# Patient Record
Sex: FEMALE | Race: WHITE | NOT HISPANIC OR LATINO | Employment: OTHER | URBAN - METROPOLITAN AREA
[De-identification: names, ages, dates, MRNs, and addresses within clinical notes are randomized per-mention and may not be internally consistent; named-entity substitution may affect disease eponyms.]

---

## 2015-11-02 LAB — BMD RECOMMENDATION EXT: NORMAL

## 2015-12-07 LAB — CRC RECOMMENDATION EXT: NORMAL

## 2018-10-17 LAB — BMD RECOMMENDATION EXT: NORMAL

## 2021-08-24 LAB
BMD RECOMMENDATION EXT: NORMAL
BMD RECOMMENDATION EXT: NORMAL

## 2023-02-09 ENCOUNTER — PATIENT MESSAGE (OUTPATIENT)
Dept: ADMINISTRATIVE | Facility: HOSPITAL | Age: 68
End: 2023-02-09
Payer: MEDICARE

## 2023-02-23 ENCOUNTER — PATIENT MESSAGE (OUTPATIENT)
Dept: FAMILY MEDICINE | Facility: CLINIC | Age: 68
End: 2023-02-23
Payer: MEDICARE

## 2023-02-24 ENCOUNTER — OFFICE VISIT (OUTPATIENT)
Dept: FAMILY MEDICINE | Facility: CLINIC | Age: 68
End: 2023-02-24
Payer: MEDICARE

## 2023-02-24 VITALS
BODY MASS INDEX: 27.28 KG/M2 | TEMPERATURE: 98 F | HEIGHT: 69 IN | HEART RATE: 64 BPM | WEIGHT: 184.19 LBS | SYSTOLIC BLOOD PRESSURE: 100 MMHG | DIASTOLIC BLOOD PRESSURE: 66 MMHG | OXYGEN SATURATION: 98 %

## 2023-02-24 DIAGNOSIS — Z87.891 PERSONAL HISTORY OF NICOTINE DEPENDENCE: ICD-10-CM

## 2023-02-24 DIAGNOSIS — Z12.31 ENCOUNTER FOR SCREENING MAMMOGRAM FOR BREAST CANCER: ICD-10-CM

## 2023-02-24 DIAGNOSIS — F17.200 CURRENTLY SMOKES TOBACCO: ICD-10-CM

## 2023-02-24 DIAGNOSIS — R00.1 BRADYCARDIA: ICD-10-CM

## 2023-02-24 DIAGNOSIS — K91.2 POSTSURGICAL MALABSORPTION, NOT ELSEWHERE CLASSIFIED: ICD-10-CM

## 2023-02-24 DIAGNOSIS — M81.0 OSTEOPOROSIS, UNSPECIFIED OSTEOPOROSIS TYPE, UNSPECIFIED PATHOLOGICAL FRACTURE PRESENCE: Primary | ICD-10-CM

## 2023-02-24 DIAGNOSIS — E88.89 OTHER SPECIFIED METABOLIC DISORDERS: ICD-10-CM

## 2023-02-24 PROCEDURE — 99214 OFFICE O/P EST MOD 30 MIN: CPT | Mod: PBBFAC

## 2023-02-24 NOTE — PROGRESS NOTES
Chief Complaint  Establish Care      History of Present Illness  Florence Swann is a 67 y.o. year old female presents to the clinic to establish care. 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   Pt reports no prior medical issues and is not currently taking any medications other than occasional Tylenol.     Her acute complaints include restless leg and bilateral lower back pain radiating to sacral area and down to bilateral legs. Both complaints have been ongoing for years, however low back pain has worsened. No bladder or bowel incontinence, no falls or trauma, no loss of motor function or sensation of lower extremities.     SH: gastric bypass in 2004, cholecystectomy, total hysterectomy, appendectomy  FH: Mother :goiter, CHF, DM. Father : DM, leukemia  Social hx: daily tobacco use;  3/4 to 1 full pack per day, no EtOH, no recreational drugs, not currently sexually active    Screening history:   Mammogram several years ago  DEXA : 2021; evidence of osteoporosis in femoral neck and lumbar spine (not on treatment or supplements)  Colonoscopy in 2015 with polyp, no repeat since  Lung CA screening-cannot recall        Review of Systems   HENT:  Negative for hearing loss.    Eyes:  Negative for discharge.   Respiratory:  Negative for wheezing.    Cardiovascular:  Negative for chest pain and palpitations.   Gastrointestinal:  Negative for blood in stool, constipation, diarrhea and vomiting.   Genitourinary:  Negative for dysuria and hematuria.   Musculoskeletal:  Positive for back pain. Negative for falls, joint pain and neck pain.   Neurological:  Negative for tingling, tremors, sensory change, weakness and headaches.   Endo/Heme/Allergies:  Negative for polydipsia.   Psychiatric/Behavioral:  The patient has insomnia.        Physical Exam  Vitals and nursing note reviewed.   Constitutional:       General: She is not in acute  distress.  Eyes:      Conjunctiva/sclera: Conjunctivae normal.   Cardiovascular:      Rate and Rhythm: Regular rhythm. Bradycardia present.      Heart sounds: Normal heart sounds.   Pulmonary:      Effort: Pulmonary effort is normal.      Breath sounds: Normal breath sounds.   Abdominal:      General: There is no distension.      Tenderness: There is no abdominal tenderness.   Musculoskeletal:      Right lower leg: No edema.      Left lower leg: No edema.   Neurological:      General: No focal deficit present.   Psychiatric:         Mood and Affect: Mood normal.         Behavior: Behavior normal.       Vitals:    02/24/23 1323   BP: 100/66   Pulse: 64   Temp: 98.1 °F (36.7 °C)     Wt Readings from Last 2 Encounters:   02/24/23 83.6 kg (184 lb 3.2 oz)         Current Outpatient Medications  Current Outpatient Medications   Medication Instructions    acetaminophen (TYLENOL ARTHRITIS PAIN ORAL) No dose, route, or frequency recorded.             Assessment / Plan:    1. Osteoporosis, unspecified osteoporosis type, unspecified pathological fracture presence  -recommended pt to be initiated on bisphosphonate therapy  -handout given on information regarding Alendronate  - checking Vitamin D and Ca levels  -will discuss with pt the possibility of Alendronate therapy as well as Vit D and Ca supplementation at next visit    2. Encounter for screening mammogram for breast cancer  -pt will need screening mammogram  -order needs to be placed at next visit, pt prefers to get things done one at a time    3. Currently smokes tobacco  4. Personal history of nicotine dependence  - CT Chest Lung Screening Low Dose ordered today  -pt is current every day smoker with 1 pack per day smoking history  - CBC, CMP ordered    5. Bradycardia  -asymptomatic  - TSH; Future  - T4, Free; Future    6. Postsurgical malabsorption, not elsewhere classified  -pt states that since her gastric bypass procedure she has not been consuming meat and has not  been taking any supplements  -checking vit B12 levels      Other orders at this visit:   - Hemoglobin A1C; Future  - Lipid Panel; Future  - Hepatitis C Antibody; Future  - HIV 1/2 Ag/Ab (4th Gen); Future        Follow up:    In 1 month, or earlier if needed. Will need kenneth and colonoscopy referral.     Christel Chao M.D.  [facility]  PGY-2      Answers submitted by the patient for this visit:  Review of Systems Questionnaire (Submitted on 2/21/2023)  activity change: Yes  unexpected weight change: No  rhinorrhea: No  trouble swallowing: No  visual disturbance: No  chest tightness: No  polyuria: No  difficulty urinating: No  menstrual problem: No  joint swelling: No  arthralgias: No  confusion: No  dysphoric mood: No

## 2023-02-26 NOTE — PROGRESS NOTES
I reviewed History, PE, A/P and chart was reviewed.  Services provided in the outpatient department of  a teaching facility, I was immediately available.  I agree with resident, care reasonable and necessary.   Management discussed with resident at time of visit.    Codi Harris MD  [facility] - [facility] Desir  [facility]

## 2023-03-03 ENCOUNTER — LAB VISIT (OUTPATIENT)
Dept: LAB | Facility: HOSPITAL | Age: 68
End: 2023-03-03
Attending: STUDENT IN AN ORGANIZED HEALTH CARE EDUCATION/TRAINING PROGRAM
Payer: MEDICARE

## 2023-03-03 DIAGNOSIS — R00.1 BRADYCARDIA: ICD-10-CM

## 2023-03-03 DIAGNOSIS — F17.200 CURRENTLY SMOKES TOBACCO: ICD-10-CM

## 2023-03-03 DIAGNOSIS — K91.2 POSTSURGICAL MALABSORPTION, NOT ELSEWHERE CLASSIFIED: ICD-10-CM

## 2023-03-03 DIAGNOSIS — Z87.891 PERSONAL HISTORY OF NICOTINE DEPENDENCE: ICD-10-CM

## 2023-03-03 DIAGNOSIS — E88.89 OTHER SPECIFIED METABOLIC DISORDERS: ICD-10-CM

## 2023-03-03 DIAGNOSIS — M81.0 OSTEOPOROSIS, UNSPECIFIED OSTEOPOROSIS TYPE, UNSPECIFIED PATHOLOGICAL FRACTURE PRESENCE: ICD-10-CM

## 2023-03-03 LAB
ALBUMIN SERPL-MCNC: 3.7 G/DL (ref 3.4–4.8)
ALBUMIN/GLOB SERPL: 1 RATIO (ref 1.1–2)
ALP SERPL-CCNC: 84 UNIT/L (ref 40–150)
ALT SERPL-CCNC: 9 UNIT/L (ref 0–55)
AST SERPL-CCNC: 15 UNIT/L (ref 5–34)
BASOPHILS # BLD AUTO: 0.1 X10(3)/MCL (ref 0–0.2)
BASOPHILS NFR BLD AUTO: 1.8 %
BILIRUBIN DIRECT+TOT PNL SERPL-MCNC: 0.8 MG/DL
BUN SERPL-MCNC: 10.2 MG/DL (ref 9.8–20.1)
CALCIUM SERPL-MCNC: 9.8 MG/DL (ref 8.4–10.2)
CHLORIDE SERPL-SCNC: 105 MMOL/L (ref 98–107)
CHOLEST SERPL-MCNC: 253 MG/DL
CHOLEST/HDLC SERPL: 4 {RATIO} (ref 0–5)
CO2 SERPL-SCNC: 24 MMOL/L (ref 23–31)
CREAT SERPL-MCNC: 0.82 MG/DL (ref 0.55–1.02)
DEPRECATED CALCIDIOL+CALCIFEROL SERPL-MC: 36.8 NG/ML (ref 30–80)
EOSINOPHIL # BLD AUTO: 0.15 X10(3)/MCL (ref 0–0.9)
EOSINOPHIL NFR BLD AUTO: 2.7 %
ERYTHROCYTE [DISTWIDTH] IN BLOOD BY AUTOMATED COUNT: 18.7 % (ref 11.5–17)
EST. AVERAGE GLUCOSE BLD GHB EST-MCNC: 111.2 MG/DL
GFR SERPLBLD CREATININE-BSD FMLA CKD-EPI: >60 MLS/MIN/1.73/M2
GLOBULIN SER-MCNC: 3.6 GM/DL (ref 2.4–3.5)
GLUCOSE SERPL-MCNC: 84 MG/DL (ref 82–115)
HBA1C MFR BLD: 5.5 %
HCT VFR BLD AUTO: 34.7 % (ref 37–47)
HCV AB SERPL QL IA: NONREACTIVE
HDLC SERPL-MCNC: 61 MG/DL (ref 35–60)
HGB BLD-MCNC: 10.8 G/DL (ref 12–16)
HIV 1+2 AB+HIV1 P24 AG SERPL QL IA: NONREACTIVE
IMM GRANULOCYTES # BLD AUTO: 0.01 X10(3)/MCL (ref 0–0.04)
IMM GRANULOCYTES NFR BLD AUTO: 0.2 %
LDLC SERPL CALC-MCNC: 175 MG/DL (ref 50–140)
LYMPHOCYTES # BLD AUTO: 1.34 X10(3)/MCL (ref 0.6–4.6)
LYMPHOCYTES NFR BLD AUTO: 24.5 %
MCH RBC QN AUTO: 22.6 PG
MCHC RBC AUTO-ENTMCNC: 31.1 G/DL (ref 33–36)
MCV RBC AUTO: 72.6 FL (ref 80–94)
MONOCYTES # BLD AUTO: 0.63 X10(3)/MCL (ref 0.1–1.3)
MONOCYTES NFR BLD AUTO: 11.5 %
NEUTROPHILS # BLD AUTO: 3.25 X10(3)/MCL (ref 2.1–9.2)
NEUTROPHILS NFR BLD AUTO: 59.3 %
NRBC BLD AUTO-RTO: 0 %
PLATELET # BLD AUTO: 394 X10(3)/MCL (ref 130–400)
PMV BLD AUTO: 9.4 FL (ref 7.4–10.4)
POTASSIUM SERPL-SCNC: 4.4 MMOL/L (ref 3.5–5.1)
PROT SERPL-MCNC: 7.3 GM/DL (ref 5.8–7.6)
RBC # BLD AUTO: 4.78 X10(6)/MCL (ref 4.2–5.4)
SODIUM SERPL-SCNC: 138 MMOL/L (ref 136–145)
T4 FREE SERPL-MCNC: 0.99 NG/DL (ref 0.7–1.48)
TRIGL SERPL-MCNC: 85 MG/DL (ref 37–140)
TSH SERPL-ACNC: 1.35 UIU/ML (ref 0.35–4.94)
VIT B12 SERPL-MCNC: 496 PG/ML (ref 213–816)
VLDLC SERPL CALC-MCNC: 17 MG/DL
WBC # SPEC AUTO: 5.5 X10(3)/MCL (ref 4.5–11.5)

## 2023-03-03 PROCEDURE — 84439 ASSAY OF FREE THYROXINE: CPT

## 2023-03-03 PROCEDURE — 82607 VITAMIN B-12: CPT

## 2023-03-03 PROCEDURE — 84443 ASSAY THYROID STIM HORMONE: CPT

## 2023-03-03 PROCEDURE — 80053 COMPREHEN METABOLIC PANEL: CPT

## 2023-03-03 PROCEDURE — 80061 LIPID PANEL: CPT

## 2023-03-03 PROCEDURE — 86803 HEPATITIS C AB TEST: CPT

## 2023-03-03 PROCEDURE — 85025 COMPLETE CBC W/AUTO DIFF WBC: CPT

## 2023-03-03 PROCEDURE — 36415 COLL VENOUS BLD VENIPUNCTURE: CPT

## 2023-03-03 PROCEDURE — 83036 HEMOGLOBIN GLYCOSYLATED A1C: CPT

## 2023-03-03 PROCEDURE — 87389 HIV-1 AG W/HIV-1&-2 AB AG IA: CPT

## 2023-03-03 PROCEDURE — 82306 VITAMIN D 25 HYDROXY: CPT

## 2023-03-07 DIAGNOSIS — Z12.31 SCREENING MAMMOGRAM FOR BREAST CANCER: Primary | ICD-10-CM

## 2023-03-07 DIAGNOSIS — Z12.11 COLON CANCER SCREENING: ICD-10-CM

## 2023-03-10 ENCOUNTER — HOSPITAL ENCOUNTER (OUTPATIENT)
Dept: RADIOLOGY | Facility: HOSPITAL | Age: 68
Discharge: HOME | End: 2023-03-10
Attending: STUDENT IN AN ORGANIZED HEALTH CARE EDUCATION/TRAINING PROGRAM
Payer: MEDICARE

## 2023-03-10 DIAGNOSIS — Z87.891 PERSONAL HISTORY OF NICOTINE DEPENDENCE: ICD-10-CM

## 2023-03-10 DIAGNOSIS — F17.200 CURRENTLY SMOKES TOBACCO: ICD-10-CM

## 2023-03-10 PROCEDURE — 71271 CT THORAX LUNG CANCER SCR C-: CPT | Mod: TC

## 2023-03-16 ENCOUNTER — HOSPITAL ENCOUNTER (OUTPATIENT)
Dept: RADIOLOGY | Facility: HOSPITAL | Age: 68
Discharge: HOME | End: 2023-03-16
Attending: STUDENT IN AN ORGANIZED HEALTH CARE EDUCATION/TRAINING PROGRAM
Payer: MEDICARE

## 2023-03-16 DIAGNOSIS — Z12.31 SCREENING MAMMOGRAM FOR BREAST CANCER: ICD-10-CM

## 2023-03-16 PROCEDURE — 77067 SCR MAMMO BI INCL CAD: CPT | Mod: 26,,, | Performed by: RADIOLOGY

## 2023-03-16 PROCEDURE — 77067 MAMMO DIGITAL SCREENING BILAT WITH TOMO: ICD-10-PCS | Mod: 26,,, | Performed by: RADIOLOGY

## 2023-03-16 PROCEDURE — 77067 SCR MAMMO BI INCL CAD: CPT | Mod: TC

## 2023-03-16 PROCEDURE — 77063 BREAST TOMOSYNTHESIS BI: CPT | Mod: 26,,, | Performed by: RADIOLOGY

## 2023-03-16 PROCEDURE — 77063 MAMMO DIGITAL SCREENING BILAT WITH TOMO: ICD-10-PCS | Mod: 26,,, | Performed by: RADIOLOGY

## 2023-03-21 ENCOUNTER — OFFICE VISIT (OUTPATIENT)
Dept: FAMILY MEDICINE | Facility: CLINIC | Age: 68
End: 2023-03-21
Payer: MEDICARE

## 2023-03-21 VITALS
DIASTOLIC BLOOD PRESSURE: 76 MMHG | WEIGHT: 184.38 LBS | RESPIRATION RATE: 18 BRPM | BODY MASS INDEX: 27.31 KG/M2 | HEIGHT: 69 IN | TEMPERATURE: 98 F | SYSTOLIC BLOOD PRESSURE: 111 MMHG | OXYGEN SATURATION: 98 % | HEART RATE: 59 BPM

## 2023-03-21 DIAGNOSIS — E78.2 MIXED HYPERLIPIDEMIA: ICD-10-CM

## 2023-03-21 DIAGNOSIS — M81.0 OSTEOPOROSIS OF LUMBAR SPINE: ICD-10-CM

## 2023-03-21 DIAGNOSIS — K95.89 COMPLICATION OF BARIATRIC PROCEDURE: Primary | ICD-10-CM

## 2023-03-21 PROCEDURE — 90750 HZV VACC RECOMBINANT IM: CPT | Mod: PBBFAC

## 2023-03-21 PROCEDURE — 99215 OFFICE O/P EST HI 40 MIN: CPT | Mod: PBBFAC,25

## 2023-03-21 PROCEDURE — 90471 IMMUNIZATION ADMIN: CPT | Mod: PBBFAC

## 2023-03-21 RX ORDER — ALENDRONATE SODIUM 70 MG/1
70 TABLET ORAL
Qty: 4 TABLET | Refills: 6 | Status: SHIPPED | OUTPATIENT
Start: 2023-03-21 | End: 2023-06-21

## 2023-03-21 RX ADMIN — Medication 0.5 ML: at 03:03

## 2023-03-21 NOTE — PROGRESS NOTES
Chief Complaint  Follow-up (1 Month f/u)      History of Present Illness  Florence Swann is a 67 y.o. year old female presents to the clinic for follow up on chronic conditions.   Pt last seen by me on 2/24/23 to establish care at which time routine lab work was done and had several health maintenance/preventative imaging ordered.   Pt has no acute issues, reviewed all findings. Pt does express to me that she has had difficulty with several different food types since her bariatric surgery several years ago and that she can only eat mashed potatoes and wheat cereal. She has not been able to eat any meat since the procedure. She is interested in having a consultation with a bariatric surgeon, who can discuss options with her in regards to possible revision of her previous surgery.         Chronic issues  Osteoporosis of femoral neck and lumbar spine-not currently on any treatment or supplements  Current tobacco smoker-1 ppd smoking h/o  H/o colon polyps in 2015  H/o gastric bypass surgery       Health Maintenance  Lung CA screen: 2023 Bi-Rads 2, repeat annual screening  DEXA recent in 2021, osteoporosis  Mammogram: 2023, official read pending  Declines PCV 20, received shingles (dose #1) in 03/2023  S/p hysterectomy, no PAP indicated        Review of Systems   HENT:  Negative for hearing loss.    Eyes:  Negative for discharge.   Respiratory:  Negative for wheezing.    Cardiovascular:  Negative for chest pain and palpitations.   Gastrointestinal:  Negative for blood in stool, constipation, diarrhea and vomiting.   Genitourinary:  Negative for dysuria and hematuria.   Musculoskeletal:  Negative for neck pain.   Neurological:  Negative for weakness and headaches.   Endo/Heme/Allergies:  Negative for polydipsia.       Physical Exam  Vitals and nursing note reviewed.   Constitutional:       General: She is not in acute distress.  HENT:      Mouth/Throat:      Comments: Pt has full set of dentures  Eyes:       Conjunctiva/sclera: Conjunctivae normal.   Cardiovascular:      Rate and Rhythm: Normal rate and regular rhythm.      Heart sounds: Normal heart sounds.   Pulmonary:      Effort: Pulmonary effort is normal.      Breath sounds: Normal breath sounds.   Abdominal:      General: There is no distension.      Tenderness: There is no abdominal tenderness.   Musculoskeletal:      Right lower leg: No edema.      Left lower leg: No edema.   Neurological:      General: No focal deficit present.   Psychiatric:         Mood and Affect: Mood normal.         Behavior: Behavior normal.       Vitals:    03/21/23 1402   BP: 111/76   Pulse: (!) 59   Resp: 18   Temp: 97.7 °F (36.5 °C)     Wt Readings from Last 2 Encounters:   03/21/23 83.6 kg (184 lb 6.4 oz)   02/24/23 83.6 kg (184 lb 3.2 oz)         Current Outpatient Medications  Current Outpatient Medications   Medication Instructions    acetaminophen (TYLENOL ARTHRITIS PAIN ORAL) No dose, route, or frequency recorded.             Assessment / Plan:    1. Complication of bariatric procedure  - Ambulatory referral/consult to General Surgery (bariatric surgeon Dr. Nomi Vale)  -vit B12 and Vit D wnl    2. Osteoporosis of lumbar spine  -pt brought old records, most recent DEXA in May of 2022 with evidence of osteoporosis  -records are still in process of being scanned  -after discussion regarding risks vs benefits and side effect profile, pt is amenable to be initiated on Alendronate  -she has set of full dentures  -Rx for Alendronate sent (started 03/2023)  -pt also recommended to supplement with Vit D (800 IU daily ) and Ca (1200 mg daily), Rx sent for combination tablet.   -repeat DEXA in 2 years to evaluate response    3. Mixed hyperlipidemia  -cholesterol 253,   -printed handout given regarding low cholesterol diet  -pt would like to wait on starting statin therapy at this time. Discussed risks vs benefits given her ASCVD risk is 8/4%    -The 10-year ASCVD risk score  (Kavita LOUIE, et al., 2019) is: 8.4%    Values used to calculate the score:      Age: 67 years      Sex: Female      Is Non- : No      Diabetic: No      Tobacco smoker: Yes      Systolic Blood Pressure: 111 mmHg      Is BP treated: No      HDL Cholesterol: 61 mg/dL      Total Cholesterol: 253 mg/dL            Follow up:    In 3 mo, or earlier if needed.     Christel Chao M.D.  [facility] PGY-2    Answers submitted by the patient for this visit:  Review of Systems Questionnaire (Submitted on 3/20/2023)  activity change: No  unexpected weight change: No  rhinorrhea: No  trouble swallowing: No  visual disturbance: No  chest tightness: No  polyuria: No  difficulty urinating: No  menstrual problem: No  joint swelling: No  arthralgias: No  confusion: No  dysphoric mood: No

## 2023-03-21 NOTE — PATIENT INSTRUCTIONS
Bariatric surgeon:   Dr. Nomi Vale  [address], [place] [address]  [phone]    -we will start you on Fosamax (Alendronate) for osteoporosis  -colonoscopy referral might take 9 months

## 2023-03-22 RX ORDER — MULTIVITAMIN
2 TABLET ORAL DAILY
Qty: 60 TABLET | Refills: 0 | Status: SHIPPED | OUTPATIENT
Start: 2023-03-22 | End: 2023-06-21

## 2023-03-23 ENCOUNTER — TELEPHONE (OUTPATIENT)
Dept: FAMILY MEDICINE | Facility: CLINIC | Age: 68
End: 2023-03-23
Payer: MEDICARE

## 2023-03-24 RX ORDER — HYDROCORTISONE 25 MG/G
CREAM TOPICAL
COMMUNITY
Start: 2023-03-22

## 2023-03-24 NOTE — PROGRESS NOTES
I reviewed History, PE, A/P and medical record.  Services provided in outpatient department of a teaching hospital/facility, I was immediately available.  I agree with resident, care reasonable and necessary.   I evaluated the patient with resident at time of visit, participated in key parts of H/P and management was discussed.    Full discussion of Fosamax, SE understood    DEXA is not in chart for my review    Need to check on Mammo - not read since 3/16/2023 this delay is unusual      Codi Harris MD  [facility] - [facility] Desir

## 2023-03-24 NOTE — TELEPHONE ENCOUNTER
Please call radiology, it shows Mammo completed but no report since 3/16/2023 which is an usual delay, please check on status

## 2023-03-30 DIAGNOSIS — Z86.010 HISTORY OF COLONIC POLYPS: Primary | ICD-10-CM

## 2023-03-30 NOTE — PROGRESS NOTES
Referral sent to [facility] as per pt's request.     Christel Chao M.D.  [facility] PGY-2

## 2023-04-05 ENCOUNTER — PATIENT MESSAGE (OUTPATIENT)
Dept: FAMILY MEDICINE | Facility: CLINIC | Age: 68
End: 2023-04-05
Payer: MEDICARE

## 2023-06-21 ENCOUNTER — OFFICE VISIT (OUTPATIENT)
Dept: FAMILY MEDICINE | Facility: CLINIC | Age: 68
End: 2023-06-21
Payer: MEDICARE

## 2023-06-21 ENCOUNTER — PATIENT MESSAGE (OUTPATIENT)
Dept: ADMINISTRATIVE | Facility: HOSPITAL | Age: 68
End: 2023-06-21
Payer: MEDICARE

## 2023-06-21 VITALS
WEIGHT: 184.19 LBS | TEMPERATURE: 98 F | SYSTOLIC BLOOD PRESSURE: 128 MMHG | DIASTOLIC BLOOD PRESSURE: 83 MMHG | OXYGEN SATURATION: 100 % | HEART RATE: 56 BPM | BODY MASS INDEX: 27.28 KG/M2 | HEIGHT: 69 IN

## 2023-06-21 DIAGNOSIS — E78.5 HYPERLIPIDEMIA, UNSPECIFIED HYPERLIPIDEMIA TYPE: ICD-10-CM

## 2023-06-21 DIAGNOSIS — Z87.39 HX OF OSTEOPOROSIS: ICD-10-CM

## 2023-06-21 DIAGNOSIS — K95.89 COMPLICATION OF BARIATRIC PROCEDURE: Primary | ICD-10-CM

## 2023-06-21 PROBLEM — Z86.010 PERSONAL HISTORY OF COLONIC POLYPS: Status: ACTIVE | Noted: 2023-06-21

## 2023-06-21 PROBLEM — K25.9 GASTRIC ULCER, UNSPECIFIED AS ACUTE OR CHRONIC, WITHOUT HEMORRHAGE OR PERFORATION: Status: ACTIVE | Noted: 2023-06-21

## 2023-06-21 PROBLEM — K20.90 ESOPHAGITIS, UNSPECIFIED WITHOUT BLEEDING: Status: ACTIVE | Noted: 2023-06-21

## 2023-06-21 PROBLEM — Z48.815 ENCOUNTER FOR SURGICAL AFTERCARE FOLLOWING SURGERY ON THE DIGESTIVE SYSTEM: Status: ACTIVE | Noted: 2023-05-31

## 2023-06-21 PROBLEM — Z86.0100 PERSONAL HISTORY OF COLONIC POLYPS: Status: ACTIVE | Noted: 2023-06-21

## 2023-06-21 PROCEDURE — 99214 OFFICE O/P EST MOD 30 MIN: CPT | Mod: PBBFAC,25

## 2023-06-21 PROCEDURE — 90471 IMMUNIZATION ADMIN: CPT | Mod: PBBFAC

## 2023-06-21 PROCEDURE — 90750 HZV VACC RECOMBINANT IM: CPT | Mod: PBBFAC

## 2023-06-21 RX ORDER — FAMOTIDINE 40 MG/1
40 TABLET, FILM COATED ORAL EVERY MORNING
COMMUNITY
Start: 2023-05-31 | End: 2023-09-26 | Stop reason: SDUPTHER

## 2023-06-21 RX ORDER — MISOPROSTOL 100 UG/1
100 TABLET ORAL 4 TIMES DAILY
COMMUNITY
Start: 2023-06-15 | End: 2023-09-26 | Stop reason: SDUPTHER

## 2023-06-21 RX ADMIN — ZOSTER VACCINE RECOMBINANT, ADJUVANTED 0.5 ML: KIT at 10:06

## 2023-06-21 NOTE — PROGRESS NOTES
[facility]     67 y.o female here for routine f/u.     Acute issues/CC:  Ms. Swann has been seeing the GI specialist since May secondary to bariatric surgery complications. She had the procedure in 2004. She has not been able to tolerate meat since 2004. She has been following with Dr. Maza and Dr. Boswell in GI clinic.   They did an EGD and noted esophageal stenosis and recommend esophageal dilation with balloon x2. She underwent the first dilation procedure and the second is scheduled for 7/18/23. They started her on Famotidine 40 mg and Misoprostol 100 mg. She denies fever, chills, chest pain, SOB, abdominal pain, constipation, and hematochezia.      Chronic issues:  Osteoporosis   -Was started on Alendronate in March 2023, but the doctor stopped the medication as it was upsetting her ulcers Dr. Maza    ROS  See above     PE  Vitals: /83; HR 56; BMI 27.20  General: pleasant and cooperative female in no acute distress  Lungs: clear to auscultation bilaterally   Heart: RRR  Abdomen: Soft, non tender with positive bowel sounds       A/P  Hx of gastric bypasses surgery in 2004  -Has not been able to tolerate meat since 2004  -Continue to follow with GI clinic for 2nd balloon procedure on 7/18/23 to help with esophageal stenosis    Hx of Osteoporosis   -Noted back in 2021. We do not have the records from DEXA in 2021. Patient is agreeable to bring in records at her next visit to be scanned with Ms. Brandt  -Alendronate was discontinued secondary to worsening ulcers  -Will wait to start medical treatment until verifying the DEXA results     HLD   -10 yr ASCVD risk score of 8.4%  -Will recheck lipid panel at her follow up. Patient understands she is to fast prior to lab draw      RTC in 1 month

## 2023-06-23 NOTE — PROGRESS NOTES
I reviewed History, PE, A/P and medical record.  Services provided in outpatient department of a teaching hospital/facility, I was immediately available.  I agree with resident. Care provided was reasonable and necessary.   I discussed the patient with resident at time of visit.   DEXA not scanned in chart but reports history of osteoporosis.   Likely needs Prolia instead of bisphosphonate due to GI concerns. Patient to bring in DEXA report.

## 2023-06-30 ENCOUNTER — TELEPHONE (OUTPATIENT)
Dept: FAMILY MEDICINE | Facility: CLINIC | Age: 68
End: 2023-06-30
Payer: MEDICARE

## 2023-07-01 NOTE — TELEPHONE ENCOUNTER
Spoke with patient regarding her endoscopy findings with Dr. Maza. Given findings of severe GJ ulcers, it is recommended patient discontinues bisphosphonate therapy for her osteoporosis. Patient understands and stated that she already stopped taking it as per Dr. Maza. Will consider Prolia instead\at her next clinic visit on 8/8/23.   All questions concerns addressed.     Christel Chao M.D.  [employer]  PGY-2

## 2023-07-24 DIAGNOSIS — K21.9 ESOPHAGEAL REFLUX: Primary | ICD-10-CM

## 2023-08-08 ENCOUNTER — OFFICE VISIT (OUTPATIENT)
Dept: FAMILY MEDICINE | Facility: CLINIC | Age: 68
End: 2023-08-08
Payer: MEDICARE

## 2023-08-08 VITALS
TEMPERATURE: 98 F | HEART RATE: 57 BPM | SYSTOLIC BLOOD PRESSURE: 108 MMHG | HEIGHT: 69 IN | BODY MASS INDEX: 26.86 KG/M2 | OXYGEN SATURATION: 98 % | WEIGHT: 181.38 LBS | DIASTOLIC BLOOD PRESSURE: 76 MMHG

## 2023-08-08 DIAGNOSIS — D50.9 MICROCYTIC ANEMIA: ICD-10-CM

## 2023-08-08 DIAGNOSIS — R53.83 FATIGUE, UNSPECIFIED TYPE: Primary | ICD-10-CM

## 2023-08-08 DIAGNOSIS — R00.1 BRADYCARDIA: ICD-10-CM

## 2023-08-08 PROBLEM — K91.89 ANASTOMOTIC STENOSIS OF GASTROJEJUNOSTOMY: Status: ACTIVE | Noted: 2023-08-08

## 2023-08-08 PROBLEM — K63.89 OTHER SPECIFIED DISEASES OF INTESTINE: Status: ACTIVE | Noted: 2023-05-31

## 2023-08-08 PROBLEM — K56.699 ANASTOMOTIC STENOSIS OF GASTROJEJUNOSTOMY: Status: ACTIVE | Noted: 2023-08-08

## 2023-08-08 PROCEDURE — 99214 OFFICE O/P EST MOD 30 MIN: CPT | Mod: PBBFAC | Performed by: STUDENT IN AN ORGANIZED HEALTH CARE EDUCATION/TRAINING PROGRAM

## 2023-08-08 PROCEDURE — 93005 ELECTROCARDIOGRAM TRACING: CPT

## 2023-08-08 RX ORDER — SERTRALINE HYDROCHLORIDE 25 MG/1
25 TABLET, FILM COATED ORAL DAILY
Qty: 40 TABLET | Refills: 0 | Status: SHIPPED | OUTPATIENT
Start: 2023-08-08 | End: 2023-09-26 | Stop reason: SDUPTHER

## 2023-08-08 NOTE — PROGRESS NOTES
Chief Complaint  C/O FATIGUE      History of Present Illness  Florence Swann is a 67 y.o. year old female presents to the clinic for generalized fatigue. Pt states that for the last couple of months she has been feeling very tired and has no energy to do anything. Pt has also been more tearful recently and feels overwhelmed with both her family life and personal health. Pt has also been experiencing a tightness/pressure sensation in her upper epigastric area since the most recent balloon dilatation on 7/18 and states at times she needs to take deep breaths to alleviate the sensation. Denies any chest pain or shortness of breath.         PMH/Chronic issues (problems addressed at this visit are noted under A&P)  Osteoporosis of femoral neck and lumbar spine-no DEXA in chart, however reviewed paper records that was asked to be scanned into chart. Started on alendronate, however discontinued due to esophageal strictures. Would likely need Prolia injections instead  H/o colon polyps in 2015, will need repeat colonoscopy  H/o gastric bypass surgery with post-operative complications. Currently seeing Dr. Maza GI specialist, s/p 2 balloon dilatation of esophageal strictures.   Microcytic anemia -on multivitamins    Health Maintenance  Lung CA screen: 2023 Bi-Rads 2, repeat annual screening  DEXA recent in 2021, osteoporosis  Mammogram: 2023, Bi-RADS 1 bilat  Declines PCV 20, received shingles (dose #1) in 03/2023, dose #2 on 06/2023  S/p hysterectomy, no PAP indicated    Review of Systems   Constitutional:  Positive for malaise/fatigue.   HENT:  Negative for hearing loss.    Eyes:  Negative for discharge.   Respiratory:  Negative for wheezing.    Cardiovascular:  Negative for chest pain and palpitations.   Gastrointestinal:  Negative for blood in stool, constipation, diarrhea and vomiting.   Genitourinary:  Negative for dysuria and hematuria.   Musculoskeletal:  Negative for neck pain.   Neurological:  Negative for  weakness and headaches.   Endo/Heme/Allergies:  Negative for polydipsia.         Physical Exam  Vitals and nursing note reviewed.   Constitutional:       General: She is not in acute distress.     Comments: Tearful during exam   Eyes:      Conjunctiva/sclera: Conjunctivae normal.   Cardiovascular:      Rate and Rhythm: Regular rhythm. Bradycardia present.      Heart sounds: Murmur heard.      Diastolic murmur is present with a grade of 1/4.   Pulmonary:      Effort: Pulmonary effort is normal.      Breath sounds: Normal breath sounds.   Musculoskeletal:      Right lower leg: No edema.      Left lower leg: No edema.   Neurological:      General: No focal deficit present.   Psychiatric:         Mood and Affect: Mood normal.         Vitals:    08/08/23 0931   BP: 108/76   Pulse: (!) 57   Temp: 97.9 °F (36.6 °C)     Wt Readings from Last 2 Encounters:   08/08/23 82.3 kg (181 lb 6.4 oz)   06/21/23 83.6 kg (184 lb 3.2 oz)         Current Outpatient Medications  Current Outpatient Medications   Medication Instructions    acetaminophen (TYLENOL ARTHRITIS PAIN ORAL) No dose, route, or frequency recorded.    famotidine (PEPCID) 40 mg, Oral, Every morning    ferrous gluconate 225 mg (27 mg iron) Tab 1 tablet, Oral, Every other day    hydrocortisone 2.5 % cream Topical (Top)    miSOPROStoL (CYTOTEC) 100 mcg, Oral, 4 times daily    sertraline (ZOLOFT) 25 mg, Oral, Daily             Assessment / Plan:    1. Fatigue, unspecified type  -likely multifactorial; depression component, anemia, bradycardia  - Echo ordered  -most recent thyroid function test and vit B12 level wnl  -initiating pt on zoloft 25mg daily, has tolerated in the past    2. Bradycardia  - EKG with bradycardia at 50 bpm, slightly increased OH interval at 204 ms, concern for 1st degree AV block  -echo ordered  -will send referral to cardiology, patient will likely need an event monitor due to symptomatic bradycardia  -most recent    3. Microcytic anemia  -will need  iron panel at next visit  -pt unable to tolerate iron supplements in the past  -will try with ferrous gluconate to be taken every other day          Follow up:    In 6 weeks, or earlier if needed. Will need iron and folic acid levels. Repeat CBC to evaluate for anemia, consider repeat thyroid function studies if fatigue persist.     Christel Chao M.D.  [facility] PGY-3      Answers submitted by the patient for this visit:  Review of Systems Questionnaire (Submitted on 8/5/2023)  activity change: No  unexpected weight change: No  rhinorrhea: No  trouble swallowing: No  visual disturbance: No  chest tightness: No  polyuria: No  difficulty urinating: No  menstrual problem: No  joint swelling: No  arthralgias: No  confusion: No  dysphoric mood: No

## 2023-08-14 NOTE — PROGRESS NOTES
Faculty addendum: Patient discussed with resident. Chart was reviewed including vitals, labs, etc. Care provided reasonable and necessary. I participated in the management of the patient and was immediately available throughout the encounter. Services were furnished in a primary care center located in the outpatient department of a [facility] hospital. I agree with the resident's findings and plan as documented in the resident's note.

## 2023-08-24 ENCOUNTER — HOSPITAL ENCOUNTER (OUTPATIENT)
Dept: CARDIOLOGY | Facility: HOSPITAL | Age: 68
Discharge: HOME | End: 2023-08-24
Attending: STUDENT IN AN ORGANIZED HEALTH CARE EDUCATION/TRAINING PROGRAM
Payer: MEDICARE

## 2023-08-24 VITALS
HEIGHT: 69 IN | DIASTOLIC BLOOD PRESSURE: 79 MMHG | BODY MASS INDEX: 26.81 KG/M2 | WEIGHT: 181 LBS | SYSTOLIC BLOOD PRESSURE: 118 MMHG

## 2023-08-24 DIAGNOSIS — R00.1 BRADYCARDIA: ICD-10-CM

## 2023-08-24 DIAGNOSIS — R53.83 FATIGUE, UNSPECIFIED TYPE: ICD-10-CM

## 2023-08-24 LAB
AV INDEX (PROSTH): 0.84
AV MEAN GRADIENT: 5 MMHG
AV PEAK GRADIENT: 12 MMHG
AV VALVE AREA BY VELOCITY RATIO: 2.42 CM²
AV VALVE AREA: 2.52 CM²
AV VELOCITY RATIO: 0.8
BSA FOR ECHO PROCEDURE: 2 M2
CV ECHO LV RWT: 0.39 CM
DOP CALC AO PEAK VEL: 1.73 M/S
DOP CALC AO VTI: 34.4 CM
DOP CALC LVOT AREA: 3 CM2
DOP CALC LVOT DIAMETER: 1.96 CM
DOP CALC LVOT PEAK VEL: 1.39 M/S
DOP CALC LVOT STROKE VOLUME: 86.85 CM3
DOP CALC MV VTI: 34.1 CM
DOP CALCLVOT PEAK VEL VTI: 28.8 CM
E WAVE DECELERATION TIME: 268.36 MSEC
E/A RATIO: 1.21
ECHO LV POSTERIOR WALL: 0.91 CM (ref 0.6–1.1)
FRACTIONAL SHORTENING: 29 % (ref 28–44)
HR MV ECHO: 52 BPM
INTERVENTRICULAR SEPTUM: 0.93 CM (ref 0.6–1.1)
IVC DIAMETER: 1.1 CM
LEFT ATRIUM SIZE: 3.03 CM
LEFT ATRIUM VOLUME INDEX MOD: 11.8 ML/M2
LEFT ATRIUM VOLUME MOD: 23.46 CM3
LEFT INTERNAL DIMENSION IN SYSTOLE: 3.29 CM (ref 2.1–4)
LEFT VENTRICLE DIASTOLIC VOLUME INDEX: 50.54 ML/M2
LEFT VENTRICLE DIASTOLIC VOLUME: 100.06 ML
LEFT VENTRICLE MASS INDEX: 73 G/M2
LEFT VENTRICLE SYSTOLIC VOLUME INDEX: 22.1 ML/M2
LEFT VENTRICLE SYSTOLIC VOLUME: 43.84 ML
LEFT VENTRICULAR INTERNAL DIMENSION IN DIASTOLE: 4.65 CM (ref 3.5–6)
LEFT VENTRICULAR MASS: 144.38 G
LV LATERAL E/E' RATIO: 5.69 M/S
LVOT MG: 2.87 MMHG
LVOT MV: 0.76 CM/S
MV MEAN GRADIENT: 1 MMHG
MV PEAK A VEL: 0.61 M/S
MV PEAK E VEL: 0.74 M/S
MV PEAK GRADIENT: 3 MMHG
MV VALVE AREA BY CONTINUITY EQUATION: 2.55 CM2
OHS LV EJECTION FRACTION SIMPSONS BIPLANE MOD: 71 %
PISA MRMAX VEL: 1.85 M/S
PISA TR MAX VEL: 2.41 M/S
RA MAJOR: 3.7 CM
RA PRESSURE ESTIMATED: 3 MMHG
RV TB RVSP: 5 MMHG
TDI LATERAL: 0.13 M/S
TR MAX PG: 23 MMHG
TRICUSPID ANNULAR PLANE SYSTOLIC EXCURSION: 3 CM
TV REST PULMONARY ARTERY PRESSURE: 26 MMHG
Z-SCORE OF LEFT VENTRICULAR DIMENSION IN END DIASTOLE: -2.06
Z-SCORE OF LEFT VENTRICULAR DIMENSION IN END SYSTOLE: -0.52

## 2023-08-24 PROCEDURE — 93306 TTE W/DOPPLER COMPLETE: CPT

## 2023-09-26 ENCOUNTER — PATIENT MESSAGE (OUTPATIENT)
Dept: FAMILY MEDICINE | Facility: CLINIC | Age: 68
End: 2023-09-26
Payer: MEDICARE

## 2023-09-26 ENCOUNTER — OFFICE VISIT (OUTPATIENT)
Dept: FAMILY MEDICINE | Facility: CLINIC | Age: 68
End: 2023-09-26
Payer: MEDICARE

## 2023-09-26 VITALS
TEMPERATURE: 98 F | BODY MASS INDEX: 27.15 KG/M2 | HEIGHT: 69 IN | RESPIRATION RATE: 20 BRPM | DIASTOLIC BLOOD PRESSURE: 79 MMHG | OXYGEN SATURATION: 100 % | HEART RATE: 52 BPM | WEIGHT: 183.31 LBS | SYSTOLIC BLOOD PRESSURE: 118 MMHG

## 2023-09-26 DIAGNOSIS — R53.83 FATIGUE, UNSPECIFIED TYPE: ICD-10-CM

## 2023-09-26 DIAGNOSIS — D50.9 MICROCYTIC ANEMIA: Primary | ICD-10-CM

## 2023-09-26 DIAGNOSIS — R00.1 BRADYCARDIA: ICD-10-CM

## 2023-09-26 DIAGNOSIS — M81.0 OSTEOPOROSIS OF LUMBAR SPINE: ICD-10-CM

## 2023-09-26 LAB
BASOPHILS # BLD AUTO: 0.09 X10(3)/MCL
BASOPHILS NFR BLD AUTO: 1.5 %
EOSINOPHIL # BLD AUTO: 0.15 X10(3)/MCL (ref 0–0.9)
EOSINOPHIL NFR BLD AUTO: 2.4 %
ERYTHROCYTE [DISTWIDTH] IN BLOOD BY AUTOMATED COUNT: 23.6 % (ref 11.5–17)
HCT VFR BLD AUTO: 40.7 % (ref 37–47)
HGB BLD-MCNC: 12.3 G/DL (ref 12–16)
IMM GRANULOCYTES # BLD AUTO: 0.01 X10(3)/MCL (ref 0–0.04)
IMM GRANULOCYTES NFR BLD AUTO: 0.2 %
IRON SATN MFR SERPL: 47 % (ref 20–50)
IRON SERPL-MCNC: 191 UG/DL (ref 50–170)
LYMPHOCYTES # BLD AUTO: 1.92 X10(3)/MCL (ref 0.6–4.6)
LYMPHOCYTES NFR BLD AUTO: 31.3 %
MCH RBC QN AUTO: 23.2 PG (ref 27–31)
MCHC RBC AUTO-ENTMCNC: 30.2 G/DL (ref 33–36)
MCV RBC AUTO: 76.6 FL (ref 80–94)
MONOCYTES # BLD AUTO: 0.59 X10(3)/MCL (ref 0.1–1.3)
MONOCYTES NFR BLD AUTO: 9.6 %
NEUTROPHILS # BLD AUTO: 3.37 X10(3)/MCL (ref 2.1–9.2)
NEUTROPHILS NFR BLD AUTO: 55 %
NRBC BLD AUTO-RTO: 0 %
PLATELET # BLD AUTO: 408 X10(3)/MCL (ref 130–400)
PMV BLD AUTO: 10 FL (ref 7.4–10.4)
RBC # BLD AUTO: 5.31 X10(6)/MCL (ref 4.2–5.4)
TIBC SERPL-MCNC: 214 UG/DL (ref 70–310)
TIBC SERPL-MCNC: 405 UG/DL (ref 250–450)
TRANSFERRIN SERPL-MCNC: 337 MG/DL (ref 173–360)
WBC # SPEC AUTO: 6.13 X10(3)/MCL (ref 4.5–11.5)

## 2023-09-26 PROCEDURE — 36415 COLL VENOUS BLD VENIPUNCTURE: CPT | Performed by: STUDENT IN AN ORGANIZED HEALTH CARE EDUCATION/TRAINING PROGRAM

## 2023-09-26 PROCEDURE — 83540 ASSAY OF IRON: CPT | Performed by: STUDENT IN AN ORGANIZED HEALTH CARE EDUCATION/TRAINING PROGRAM

## 2023-09-26 PROCEDURE — 85025 COMPLETE CBC W/AUTO DIFF WBC: CPT | Performed by: STUDENT IN AN ORGANIZED HEALTH CARE EDUCATION/TRAINING PROGRAM

## 2023-09-26 PROCEDURE — 99214 OFFICE O/P EST MOD 30 MIN: CPT | Mod: PBBFAC | Performed by: STUDENT IN AN ORGANIZED HEALTH CARE EDUCATION/TRAINING PROGRAM

## 2023-09-26 RX ORDER — FAMOTIDINE 40 MG/1
40 TABLET, FILM COATED ORAL EVERY MORNING
Qty: 90 TABLET | Refills: 0 | Status: SHIPPED | OUTPATIENT
Start: 2023-09-26 | End: 2023-12-05 | Stop reason: SDUPTHER

## 2023-09-26 RX ORDER — MISOPROSTOL 100 UG/1
100 TABLET ORAL 4 TIMES DAILY
Qty: 120 TABLET | Refills: 1 | Status: SHIPPED | OUTPATIENT
Start: 2023-09-26 | End: 2024-01-05 | Stop reason: SDUPTHER

## 2023-09-26 RX ORDER — SERTRALINE HYDROCHLORIDE 25 MG/1
25 TABLET, FILM COATED ORAL DAILY
Qty: 90 TABLET | Refills: 0 | Status: SHIPPED | OUTPATIENT
Start: 2023-09-26 | End: 2024-01-05 | Stop reason: SDUPTHER

## 2023-09-26 RX ORDER — QUINIDINE GLUCONATE 324 MG
1 TABLET, EXTENDED RELEASE ORAL EVERY OTHER DAY
COMMUNITY
Start: 2023-08-08 | End: 2023-09-26

## 2023-09-26 NOTE — PROGRESS NOTES
Chief Complaint  Follow-up (6 week follow up, medication refills pepcid, iron, misoprostol, etc.)      History of Present Illness  Florence Swann is a 67 y.o. year old female presents to the clinic for follow up on fatigue. Pt reports feeling much better and back to her baseline after initiation of Zoloft. States she was able to finish cleaning her house and perform other duties around the house without issues. She has no complaints today. Pt got declined for revision of her bariatric surgery by Dr. Rahman's office without explanation. Interested in finding another provider.     PMH/Chronic issues (problems addressed at this visit are noted under A&P)  Osteoporosis of femoral neck and lumbar spine-no DEXA in chart, however reviewed paper records that was asked to be scanned into chart. Started on alendronate, however discontinued due to esophageal strictures. Would likely need Prolia injections instead  H/o colon polyps in 2015, will need repeat colonoscopy. Reached out to Dr. Maza office 9/26/23, they will call pt to schedule appt.   H/o gastric bypass surgery with post-operative complications. Currently seeing Dr. Maza GI specialist, s/p 2 balloon dilatation of esophageal strictures.   Microcytic anemia -on multivitamins, last hgb 10.8     Health Maintenance  Lung CA screen: 2023 Bi-Rads 2, repeat annual screening  DEXA recent in 2021, osteoporosis. Will need to discuss prolia injections as alternative treatmen optin.   Mammogram: 2023, Bi-RADS 1 bilat  Declines PCV 20, received shingles (dose #1) in 03/2023, dose #2 on 06/2023  S/p hysterectomy, no PAP indicated      Review of Systems   HENT:  Negative for hearing loss.    Eyes:  Negative for discharge.   Respiratory:  Negative for wheezing.    Cardiovascular:  Negative for chest pain and palpitations.   Gastrointestinal:  Negative for blood in stool, constipation, diarrhea and vomiting.   Genitourinary:  Negative for dysuria and hematuria.    Musculoskeletal:  Negative for neck pain.   Neurological:  Negative for weakness and headaches.   Endo/Heme/Allergies:  Negative for polydipsia.         Physical Exam  Vitals and nursing note reviewed.   Constitutional:       General: She is not in acute distress.  Eyes:      Conjunctiva/sclera: Conjunctivae normal.   Cardiovascular:      Rate and Rhythm: Regular rhythm. Bradycardia present.      Heart sounds: Normal heart sounds.   Pulmonary:      Effort: Pulmonary effort is normal.      Breath sounds: Normal breath sounds.   Musculoskeletal:      Right lower leg: No edema.      Left lower leg: No edema.   Neurological:      General: No focal deficit present.   Psychiatric:         Mood and Affect: Mood normal.         Speech: Speech normal.         Vitals:    09/26/23 0943   BP: 118/79   Pulse: (!) 52   Resp: 20   Temp: 98.3 °F (36.8 °C)     Wt Readings from Last 2 Encounters:   09/26/23 83.1 kg (183 lb 4.8 oz)   08/24/23 82.1 kg (181 lb)         Current Outpatient Medications  Current Outpatient Medications   Medication Instructions    acetaminophen (TYLENOL ARTHRITIS PAIN ORAL) No dose, route, or frequency recorded.    famotidine (PEPCID) 40 mg, Oral, Every morning    ferrous gluconate 225 mg (27 mg iron) Tab 1 tablet, Oral, Every other day    hydrocortisone 2.5 % cream Topical (Top)    miSOPROStoL (CYTOTEC) 100 mcg, Oral, 4 times daily    sertraline (ZOLOFT) 25 mg, Oral, Daily             Assessment / Plan:    1. Microcytic anemia  - Iron and TIBC  - CBC Auto Differential      2. Fatigue, unspecified type  -cbc, iron panel ordered today  -doing better, continue with zoloft 25mg      3. Bradycardia  -seen by [facility], 48 hr Holter done, results pending  -remains asymptomatic    4. Osteoporosis of lumbar spine  -not a candidate for aldendronate therapy due to presence of esophageal strictures,   -will discuss Prolia injections next visit      Follow up:    In 3 mo, or earlier if needed.     Christel Chao M.D.  [facility]  FM PGY-3      Answers submitted by the patient for this visit:  Review of Systems Questionnaire (Submitted on 9/23/2023)  activity change: No  unexpected weight change: No  rhinorrhea: No  trouble swallowing: No  visual disturbance: No  chest tightness: No  polyuria: No  difficulty urinating: No  menstrual problem: No  joint swelling: No  arthralgias: No  confusion: No  dysphoric mood: No

## 2023-10-09 NOTE — PROGRESS NOTES
Faculty addendum: Patient discussed and examined with resident on day of encounter.  Care provided reasonable and necessary. I participated in the management of the patient and was immediately available throughout the encounter. Services were furnished in a primary care center located in the outpatient department of a [facility]. I agree with the resident's findings and plan as documented in the resident's note.     Physical exam: well appearing, pleasant, cheerful, bradycardic with regular rhythm, CTA bilaterally    Deanna Howe, DO

## 2023-10-20 ENCOUNTER — PATIENT MESSAGE (OUTPATIENT)
Dept: FAMILY MEDICINE | Facility: CLINIC | Age: 68
End: 2023-10-20
Payer: MEDICARE

## 2023-10-20 DIAGNOSIS — K95.89 COMPLICATION OF BARIATRIC PROCEDURE: Primary | ICD-10-CM

## 2023-10-30 ENCOUNTER — PATIENT MESSAGE (OUTPATIENT)
Dept: FAMILY MEDICINE | Facility: CLINIC | Age: 68
End: 2023-10-30
Payer: MEDICARE

## 2023-11-28 ENCOUNTER — TELEPHONE (OUTPATIENT)
Dept: FAMILY MEDICINE | Facility: CLINIC | Age: 68
End: 2023-11-28
Payer: MEDICARE

## 2023-11-28 NOTE — TELEPHONE ENCOUNTER
Patient need medical clearance, will be undergoing general anesthesia. If she need appointment send to , and form in your box.

## 2023-12-01 ENCOUNTER — TELEPHONE (OUTPATIENT)
Dept: FAMILY MEDICINE | Facility: CLINIC | Age: 68
End: 2023-12-01
Payer: MEDICARE

## 2023-12-05 ENCOUNTER — PATIENT MESSAGE (OUTPATIENT)
Dept: FAMILY MEDICINE | Facility: CLINIC | Age: 68
End: 2023-12-05
Payer: MEDICARE

## 2023-12-05 RX ORDER — FAMOTIDINE 20 MG/1
20 TABLET, FILM COATED ORAL EVERY MORNING
Qty: 90 TABLET | Refills: 0 | Status: SHIPPED | OUTPATIENT
Start: 2023-12-05 | End: 2024-01-05 | Stop reason: SDUPTHER

## 2023-12-11 ENCOUNTER — OFFICE VISIT (OUTPATIENT)
Dept: FAMILY MEDICINE | Facility: CLINIC | Age: 68
End: 2023-12-11
Payer: MEDICARE

## 2023-12-11 ENCOUNTER — HOSPITAL ENCOUNTER (OUTPATIENT)
Dept: RADIOLOGY | Facility: HOSPITAL | Age: 68
Discharge: HOME | End: 2023-12-11
Attending: STUDENT IN AN ORGANIZED HEALTH CARE EDUCATION/TRAINING PROGRAM
Payer: MEDICARE

## 2023-12-11 VITALS
HEIGHT: 69 IN | BODY MASS INDEX: 28.49 KG/M2 | HEART RATE: 53 BPM | TEMPERATURE: 98 F | OXYGEN SATURATION: 98 % | SYSTOLIC BLOOD PRESSURE: 120 MMHG | DIASTOLIC BLOOD PRESSURE: 72 MMHG | WEIGHT: 192.38 LBS

## 2023-12-11 DIAGNOSIS — Z01.818 PREOPERATIVE CLEARANCE: Primary | ICD-10-CM

## 2023-12-11 DIAGNOSIS — Z01.818 PREOPERATIVE CLEARANCE: ICD-10-CM

## 2023-12-11 DIAGNOSIS — E44.1 MILD PROTEIN-CALORIE MALNUTRITION: ICD-10-CM

## 2023-12-11 LAB
APPEARANCE UR: CLEAR
BACTERIA #/AREA URNS AUTO: ABNORMAL /HPF
BILIRUB UR QL STRIP.AUTO: NEGATIVE
COLOR UR AUTO: ABNORMAL
GLUCOSE UR QL STRIP.AUTO: NORMAL
GROUP & RH: NORMAL
HYALINE CASTS #/AREA URNS LPF: ABNORMAL /LPF
INDIRECT COOMBS GEL: NORMAL
INR PPP: 0.9
KETONES UR QL STRIP.AUTO: NEGATIVE
LEUKOCYTE ESTERASE UR QL STRIP.AUTO: NEGATIVE
NITRITE UR QL STRIP.AUTO: NEGATIVE
PH UR STRIP.AUTO: 7 [PH]
PREALB SERPL-MCNC: 24 MG/DL (ref 14–37)
PROT UR QL STRIP.AUTO: NEGATIVE
PROTHROMBIN TIME: 12.7 SECONDS (ref 11.4–14)
RBC #/AREA URNS AUTO: ABNORMAL /HPF
RBC UR QL AUTO: NEGATIVE
SP GR UR STRIP.AUTO: 1.01 (ref 1–1.03)
SPECIMEN OUTDATE: NORMAL
SQUAMOUS #/AREA URNS LPF: ABNORMAL /HPF
UROBILINOGEN UR STRIP-ACNC: NORMAL
WBC #/AREA URNS AUTO: ABNORMAL /HPF

## 2023-12-11 PROCEDURE — 99214 OFFICE O/P EST MOD 30 MIN: CPT | Mod: PBBFAC,25 | Performed by: STUDENT IN AN ORGANIZED HEALTH CARE EDUCATION/TRAINING PROGRAM

## 2023-12-11 PROCEDURE — 71046 X-RAY EXAM CHEST 2 VIEWS: CPT | Mod: TC

## 2023-12-11 PROCEDURE — 84134 ASSAY OF PREALBUMIN: CPT | Performed by: STUDENT IN AN ORGANIZED HEALTH CARE EDUCATION/TRAINING PROGRAM

## 2023-12-11 PROCEDURE — 81001 URINALYSIS AUTO W/SCOPE: CPT | Performed by: STUDENT IN AN ORGANIZED HEALTH CARE EDUCATION/TRAINING PROGRAM

## 2023-12-11 PROCEDURE — 36415 COLL VENOUS BLD VENIPUNCTURE: CPT | Performed by: STUDENT IN AN ORGANIZED HEALTH CARE EDUCATION/TRAINING PROGRAM

## 2023-12-11 PROCEDURE — 85610 PROTHROMBIN TIME: CPT | Performed by: STUDENT IN AN ORGANIZED HEALTH CARE EDUCATION/TRAINING PROGRAM

## 2023-12-11 PROCEDURE — 86850 RBC ANTIBODY SCREEN: CPT | Performed by: STUDENT IN AN ORGANIZED HEALTH CARE EDUCATION/TRAINING PROGRAM

## 2023-12-11 RX ORDER — TRAMADOL HYDROCHLORIDE 50 MG/1
50 TABLET ORAL EVERY 8 HOURS PRN
COMMUNITY
Start: 2023-11-14 | End: 2024-01-05

## 2023-12-11 RX ORDER — TIZANIDINE 4 MG/1
4 TABLET ORAL EVERY 8 HOURS PRN
COMMUNITY
Start: 2023-11-14 | End: 2024-01-05

## 2023-12-11 NOTE — PROGRESS NOTES
Chief Complaint  surgery clearance (Back surgery)      History of Present Illness  Florence Swann is a 68 y.o. year old female presents to the clinic for surgery clearance. Pt is scheduled to have lumbar fusion surgery with Dr. Soliz on 12/20/23, who is requesting medical clearance. Pt is doing well, has no acute complaints.     PMH/Chronic issues (problems addressed at this visit are noted under A&P)  Osteoporosis of femoral neck and lumbar spine-DEXA from  2015 with L hip osteoporosis with T of -2.6. R hip osteopenia with T -2.1 , lumbar spine osteopenia with T of -1.5. Started on alendronate, however discontinued due to esophageal strictures. Would likely need Prolia injections instead  H/o colon polyps in 2015, will need repeat colonoscopy. Reached out to Dr. Maza office 9/26/23, they will call pt to schedule appt.   H/o gastric bypass surgery with post-operative complications. Currently seeing Dr. Maza GI specialist, s/p 2 balloon dilatation of esophageal strictures.   Microcytic anemia -on multivitamins, last hgb 10.8  1st degree AV block, sinus jamison cardia. Seeing Dr. Perez cardiology. 24 hour event monitor confirming 1st degree AV block, sinus jamison with occasional PAC and junctional. Had lexiscan done 09/2023, normal perfusion study.   Lumbar disk disease, sees Dr. Cr with LOS, scheduled to have fusion surgery on 12/20/23     Health Maintenance  Lung CA screen: 2023 Bi-Rads 2, repeat annual screening  DEXA recent in 2021, osteoporosis. Will need to discuss prolia injections as alternative treatmen optin.   Mammogram: 04/2023, Bi-RADS 1 bilat  Declines PCV 20, received shingles (dose #1) in 03/2023, dose #2 on 06/2023  S/p hysterectomy, no PAP indicated      Review of Systems   HENT:  Negative for hearing loss.    Eyes:  Negative for discharge.   Respiratory:  Negative for wheezing.    Cardiovascular:  Negative for chest pain and palpitations.   Gastrointestinal:  Negative for blood in stool,  constipation, diarrhea and vomiting.   Genitourinary:  Negative for dysuria and hematuria.   Musculoskeletal:  Negative for neck pain.   Neurological:  Negative for weakness and headaches.   Endo/Heme/Allergies:  Negative for polydipsia.         Physical Exam  Vitals and nursing note reviewed.   Constitutional:       General: She is not in acute distress.  Eyes:      Conjunctiva/sclera: Conjunctivae normal.   Cardiovascular:      Rate and Rhythm: Regular rhythm. Bradycardia present.      Heart sounds: Normal heart sounds.   Pulmonary:      Effort: Pulmonary effort is normal.      Breath sounds: Normal breath sounds.   Musculoskeletal:      Right lower leg: No edema.      Left lower leg: No edema.   Neurological:      General: No focal deficit present.   Psychiatric:         Mood and Affect: Mood normal.         Vitals:    12/11/23 0940   BP: 120/72   Pulse: (!) 53   Temp: 97.6 °F (36.4 °C)     Wt Readings from Last 2 Encounters:   12/11/23 87.3 kg (192 lb 6.4 oz)   09/26/23 83.1 kg (183 lb 4.8 oz)         Current Outpatient Medications  Current Outpatient Medications   Medication Instructions    acetaminophen (TYLENOL ARTHRITIS PAIN ORAL) No dose, route, or frequency recorded.    famotidine (PEPCID) 20 mg, Oral, Every morning    ferrous gluconate 225 mg (27 mg iron) Tab 1 tablet, Oral, Every other day    hydrocortisone 2.5 % cream Topical (Top)    miSOPROStoL (CYTOTEC) 100 mcg, Oral, 4 times daily    sertraline (ZOLOFT) 25 mg, Oral, Daily    tiZANidine (ZANAFLEX) 4 mg, Oral, Every 8 hours PRN    traMADoL (ULTRAM) 50 mg, Oral, Every 8 hours PRN             Assessment / Plan:    1. Preoperative clearance  -following labs are ordered today:   - X-Ray Chest PA And Lateral; Future  - Protime-INR  - Urinalysis, Reflex to Urine Culture  - Prealbumin  - Type & Screen  - EKG, CBC, CMP, A1c already done  -will print out these results and fax over to Dr. Soliz's office    -pt has a MET score of at least 4; no history of  diabetes, HTN or pulmonary disease. Current smoker.   -based on my current knowledge of this patient, she is low risk for a low/moderate risk surgery necessitating general anesthesia        Follow up:    In 8 weeks, or earlier if needed. Ask about recent dexa and possible Prolia injections for osteoporosis    Christel Chao M.D.  [facility] PGY-3      Answers submitted by the patient for this visit:  Review of Systems Questionnaire (Submitted on 12/5/2023)  activity change: No  unexpected weight change: No  rhinorrhea: No  trouble swallowing: No  visual disturbance: No  chest tightness: No  polyuria: No  difficulty urinating: No  menstrual problem: No  joint swelling: No  arthralgias: No  confusion: No  dysphoric mood: No

## 2023-12-19 ENCOUNTER — DOCUMENTATION ONLY (OUTPATIENT)
Dept: ADMINISTRATIVE | Facility: HOSPITAL | Age: 68
End: 2023-12-19
Payer: MEDICARE

## 2024-01-05 ENCOUNTER — OFFICE VISIT (OUTPATIENT)
Dept: FAMILY MEDICINE | Facility: CLINIC | Age: 69
End: 2024-01-05
Payer: MEDICARE

## 2024-01-05 VITALS
OXYGEN SATURATION: 97 % | TEMPERATURE: 98 F | HEART RATE: 57 BPM | RESPIRATION RATE: 18 BRPM | SYSTOLIC BLOOD PRESSURE: 135 MMHG | BODY MASS INDEX: 29.62 KG/M2 | DIASTOLIC BLOOD PRESSURE: 61 MMHG | HEIGHT: 69 IN | WEIGHT: 200 LBS

## 2024-01-05 DIAGNOSIS — Z98.890 POST-OPERATIVE STATE: ICD-10-CM

## 2024-01-05 PROCEDURE — 99214 OFFICE O/P EST MOD 30 MIN: CPT | Mod: PBBFAC | Performed by: STUDENT IN AN ORGANIZED HEALTH CARE EDUCATION/TRAINING PROGRAM

## 2024-01-05 RX ORDER — FAMOTIDINE 20 MG/1
20 TABLET, FILM COATED ORAL EVERY MORNING
Qty: 90 TABLET | Refills: 0 | Status: SHIPPED | OUTPATIENT
Start: 2024-01-05

## 2024-01-05 RX ORDER — ONDANSETRON 4 MG/1
4 TABLET, ORALLY DISINTEGRATING ORAL EVERY 8 HOURS PRN
Qty: 30 TABLET | Refills: 0 | Status: SHIPPED | OUTPATIENT
Start: 2024-01-05

## 2024-01-05 RX ORDER — SERTRALINE HYDROCHLORIDE 25 MG/1
25 TABLET, FILM COATED ORAL DAILY
Qty: 90 TABLET | Refills: 0 | Status: SHIPPED | OUTPATIENT
Start: 2024-01-05 | End: 2025-01-04

## 2024-01-05 RX ORDER — MISOPROSTOL 100 UG/1
100 TABLET ORAL 4 TIMES DAILY
Qty: 120 TABLET | Refills: 1 | Status: SHIPPED | OUTPATIENT
Start: 2024-01-05

## 2024-01-05 NOTE — PROGRESS NOTES
Chief Complaint  Hospital Follow Up, Nausea (Requested medication (Phenergan)), Medication Refill (Misoprostol, famotidine, and Sertraline), and Food Insecurity Absent      History of Present Illness  Florence Swann is a 68 y.o. year old female presents to the clinic for follow up after lumbar (L4-L5-S1) fusion surgery on 12/20/23 by Dr. Soliz. She is doing really well, she had her post-op with the performing physician. Her current pain is 1/10, not requiring any narcotics. Wearing the post-operative brace without problems.   At this time pt would like to hold off on initiating Prolia injections for her osteoporosis, as she would like to first completely heal from her back surgery.   Pt also informed me that she was told by her GI doctor Shaunna, that her next colonoscopy will not be until 2025. Requested pt bring in records.         PMH/Chronic issues (problems addressed at this visit are noted under A&P)  Osteoporosis of femoral neck and lumbar spine-DEXA from  2015 with L hip osteoporosis with T of -2.6. R hip osteopenia with T -2.1 , lumbar spine osteopenia with T of -1.5. Started on alendronate, however discontinued due to esophageal strictures. Would likely need Prolia injections instead, pt would like to hold off  H/o colon polyps in 2015, will need repeat colonoscopy. Pt sees Dr. Maza, who recommended colonoscopy in 2025. Requested records from pt.    H/o gastric bypass surgery with post-operative complications. Appt pending with Dr. Terri Greco for consultation for possible revision  S/p 2 balloon dilatation of esophageal strictures with Dr. Maza.   Microcytic anemia -on multivitamins, last hgb 12.3  1st degree AV block, sinus bradycardia. Seeing Dr. Perez cardiology. 24 hour event monitor confirming 1st degree AV block, sinus jamison with occasional PAC and junctional. Had lexiscan done 09/2023, normal perfusion study.   Lumbar disk disease, sees Dr. Cr with LOS, s/p fusion surgery on 12/20/23      Health Maintenance  Lung CA screen: 03/2023 Bi-Rads 2, repeat annual screening  DEXA recent in 2021, osteoporosis. Amenable to prolia injections as alternative treatmen option to alendronate, but would like to defer until after complete resolution of her post-op pain from lumbar fusion in 12/2023.   Mammogram: 04/2023, Bi-RADS 1 bilat  Declines PCV 20, received shingles (dose #1) in 03/2023, dose #2 on 06/2023  S/p hysterectomy, no PAP indicated      Review of Systems   HENT:  Negative for hearing loss.    Eyes:  Negative for discharge.   Respiratory:  Negative for wheezing.    Cardiovascular:  Negative for chest pain and palpitations.   Gastrointestinal:  Negative for blood in stool, constipation, diarrhea and vomiting.   Genitourinary:  Negative for dysuria and hematuria.   Musculoskeletal:  Negative for neck pain.   Neurological:  Negative for weakness and headaches.   Endo/Heme/Allergies:  Negative for polydipsia.         Physical Exam  Vitals and nursing note reviewed.   Constitutional:       General: She is not in acute distress.     Comments: Pt is wearing post-operative brace   Cardiovascular:      Rate and Rhythm: Regular rhythm. Bradycardia present. Occasional Extrasystoles are present.     Heart sounds: Normal heart sounds.   Pulmonary:      Effort: Pulmonary effort is normal.      Breath sounds: Normal breath sounds.   Musculoskeletal:      Right lower leg: No edema.      Left lower leg: No edema.   Skin:     Comments: Two linear incisions noted on lower back, without evidence of erythema, warmth,induration, tenderness, dehiscence or discharge.    Neurological:      General: No focal deficit present.         Vitals:    01/05/24 1304   BP: 135/61   Pulse: (!) 57   Resp: 18   Temp: 97.5 °F (36.4 °C)     Wt Readings from Last 2 Encounters:   01/05/24 90.7 kg (200 lb)   12/11/23 87.3 kg (192 lb 6.4 oz)         Current Outpatient Medications  Current Outpatient Medications   Medication Instructions     acetaminophen (TYLENOL ARTHRITIS PAIN ORAL) No dose, route, or frequency recorded.    famotidine (PEPCID) 20 mg, Oral, Every morning    ferrous gluconate 225 mg (27 mg iron) Tab 1 tablet, Oral, Every other day    hydrocortisone 2.5 % cream Topical (Top)    miSOPROStoL (CYTOTEC) 100 mcg, Oral, 4 times daily    sertraline (ZOLOFT) 25 mg, Oral, Daily             Assessment / Plan:    1. Post-operative state  -doing well  -continue with activity modifications as recommended by surgery          Follow up:    Keep appt on 2/20 with PCP. Asked pt to bring in her colonoscopy records.     Christel Chao M.D.  [employer]  PGY-3        Answers submitted by the patient for this visit:  Review of Systems Questionnaire (Submitted on 1/1/2024)  activity change: Yes  unexpected weight change: No  rhinorrhea: No  trouble swallowing: No  visual disturbance: No  chest tightness: No  polyuria: No  difficulty urinating: No  menstrual problem: No  joint swelling: No  arthralgias: No  confusion: No  dysphoric mood: No

## 2024-02-20 ENCOUNTER — OFFICE VISIT (OUTPATIENT)
Dept: FAMILY MEDICINE | Facility: CLINIC | Age: 69
End: 2024-02-20
Payer: MEDICARE

## 2024-02-20 VITALS
RESPIRATION RATE: 16 BRPM | BODY MASS INDEX: 29.95 KG/M2 | SYSTOLIC BLOOD PRESSURE: 117 MMHG | OXYGEN SATURATION: 97 % | TEMPERATURE: 98 F | HEIGHT: 69 IN | DIASTOLIC BLOOD PRESSURE: 76 MMHG | HEART RATE: 58 BPM | WEIGHT: 202.19 LBS

## 2024-02-20 DIAGNOSIS — K95.89 COMPLICATION OF BARIATRIC PROCEDURE: Primary | ICD-10-CM

## 2024-02-20 PROCEDURE — 99214 OFFICE O/P EST MOD 30 MIN: CPT | Mod: PBBFAC | Performed by: STUDENT IN AN ORGANIZED HEALTH CARE EDUCATION/TRAINING PROGRAM

## 2024-02-20 RX ORDER — FAMOTIDINE 20 MG/1
20 TABLET, FILM COATED ORAL
COMMUNITY
End: 2024-04-11

## 2024-02-20 RX ORDER — HYDROCORTISONE 25 MG/G
CREAM TOPICAL 2 TIMES DAILY
Qty: 3.5 G | Refills: 0 | Status: SHIPPED | OUTPATIENT
Start: 2024-02-20

## 2024-02-20 RX ORDER — MULTIVIT-MIN/IRON/FOLIC ACID/K 45-800-120
CAPSULE ORAL
COMMUNITY

## 2024-02-20 NOTE — PROGRESS NOTES
Chief Complaint  Medication Refill      History of Present Illness  Florence Swann is a 68 y.o. year old female presents to the clinic for medication refill. Pt is s/p lumbar fusion surgery on 12/20/23, has been doing well since, no complaints.   Of note, She is scheduled for bariatric revision surgery with Dr. Shannon Greco on 3/12/24 for anastomotic stricture of gastrojejunostomy. Pt obtained cardiac clearance.   Pt also requesting refill for her hydrocortisone 2.5% cream, she occasionally gets a rash under her breasts when it is hot outside, no sx currently            PMH/Chronic issues (problems addressed at this visit are noted under A&P)  Osteoporosis of femoral neck and lumbar spine- DEXA 08/2021: left femoral neck T score -2.8. FRAX 14% for major osteoporotic fx, 3.3% for hip fracture. L3 T score -2.5Started on alendronate, however discontinued due to esophageal strictures. Would likely need Prolia injections instead, pt would like to hold off  H/o colon polyps in 2015, will need repeat colonoscopy. Pt sees Dr. Maza, who recommended colonoscopy in 2025. Requested records from pt.    H/o gastric bypass surgery with post-operative complications. Scheduled for revision surgery 3/12/24 with Dr. Shannon Greco  S/p 2 balloon dilatation of esophageal strictures with Dr. Maza.   Microcytic anemia -on multivitamins, last hgb 12.3  1st degree AV block, sinus bradycardia. Seeing Dr. ePrez cardiology. 24 hour event monitor confirming 1st degree AV block, sinus jamison with occasional PAC and junctional. Had lexiscan done 09/2023, normal perfusion study.   Lumbar disk disease, sees Dr. Cr with LOS, s/p fusion surgery on 12/20/23       Health Maintenance  Lung CA screen: 03/2023 Bi-Rads 2, repeat annual screening  DEXA recent in 2021, osteoporosis. Amenable to prolia injections as alternative treatmen option to alendronate, but would like to defer until after complete resolution of her post-op pain from lumbar fusion  in 12/2023.   Mammogram: 04/2023, Bi-RADS 1 bilat  Declines PCV 20, received shingles (dose #1) in 03/2023, dose #2 on 06/2023  S/p hysterectomy, no PAP indicated      Review of Systems   HENT:  Negative for hearing loss.    Eyes:  Negative for discharge.   Respiratory:  Negative for wheezing.    Cardiovascular:  Negative for chest pain and palpitations.   Gastrointestinal:  Negative for blood in stool, constipation, diarrhea and vomiting.   Genitourinary:  Negative for dysuria and hematuria.   Musculoskeletal:  Negative for neck pain.   Neurological:  Negative for weakness and headaches.   Endo/Heme/Allergies:  Negative for polydipsia.         Physical Exam  Vitals and nursing note reviewed.   Constitutional:       General: She is not in acute distress.  Eyes:      Conjunctiva/sclera: Conjunctivae normal.   Cardiovascular:      Rate and Rhythm: Regular rhythm. Bradycardia present. Occasional Extrasystoles are present.     Heart sounds: Normal heart sounds.   Pulmonary:      Effort: Pulmonary effort is normal.      Breath sounds: Normal breath sounds.   Musculoskeletal:      Right lower leg: No edema.      Left lower leg: No edema.   Neurological:      General: No focal deficit present.   Psychiatric:         Mood and Affect: Mood normal.         Vitals:    02/20/24 0932   BP: 117/76   Pulse: (!) 58   Resp: 16   Temp: 97.9 °F (36.6 °C)     Wt Readings from Last 2 Encounters:   02/20/24 91.7 kg (202 lb 3.2 oz)   01/05/24 90.7 kg (200 lb)         Current Outpatient Medications  Current Outpatient Medications   Medication Instructions    acetaminophen (TYLENOL ARTHRITIS PAIN ORAL) No dose, route, or frequency recorded.    famotidine (PEPCID) 20 mg, Oral, Every morning    famotidine (PEPCID) 20 mg, Oral    hydrocortisone 2.5 % cream Topical (Top)    hydrocortisone 2.5 % cream Topical (Top), 2 times daily    miSOPROStoL (CYTOTEC) 100 mcg, Oral, 4 times daily    multivitamin-min-iron-FA-vit K (BARIATRIC MULTIVITAMINS) 45  mg iron- 800 mcg-120 mcg Cap Oral    ondansetron (ZOFRAN-ODT) 4 mg, Oral, Every 8 hours PRN    sertraline (ZOLOFT) 25 mg, Oral, Daily             Assessment / Plan:    1. Complication of bariatric procedure  -pt scheduled for surgery 3/12/24, surgical clearance already done by cardiology  -will see pt in 6 weeks after her procedure    Rx for hydrocortisone 2.5% cream sent.         Follow up:    In 6 weeks after surgery, or earlier if needed.     Christel Chao M.D.  [facility] PGY-3  Answers submitted by the patient for this visit:  Review of Systems Questionnaire (Submitted on 2/13/2024)  activity change: No  unexpected weight change: No  rhinorrhea: No  trouble swallowing: No  visual disturbance: No  chest tightness: No  polyuria: No  difficulty urinating: No  menstrual problem: No  joint swelling: No  arthralgias: No  confusion: No  dysphoric mood: No

## 2024-02-21 ENCOUNTER — DOCUMENTATION ONLY (OUTPATIENT)
Dept: FAMILY MEDICINE | Facility: CLINIC | Age: 69
End: 2024-02-21
Payer: MEDICARE

## 2024-02-21 DIAGNOSIS — Z87.891 PERSONAL HISTORY OF NICOTINE DEPENDENCE: ICD-10-CM

## 2024-02-21 DIAGNOSIS — Z12.31 SCREENING MAMMOGRAM FOR BREAST CANCER: Primary | ICD-10-CM

## 2024-02-21 NOTE — PROGRESS NOTES
Reviewed Health Maintenance screening    Breast cancer screening: Mammogram 04/2023 Bi-Rads 1 bilaterally. Due 04/2024 (ordered)  Osteoporosis screening: DEXA in 2021;  osteoporosis in femoral neck and lumbar spine -initiated on alendronate in 03/2023 but was stopped due to esophageal strictures. Will need alternative treatment.   Colon cancer screening: Colonoscopy in 2015 with one polyp, seen by GI Dr. Maza, will repeat colonoscopy in 2025  Lung  screening 03/2023 : 3 mm nodule in the right upper lobe. There is a 5 mm nodule in the right upper lobe.  birads 2 bilat, needs repeat in 12 mo (ordered)  Cervical cancer screening: S/p total hysterectomy, no PAP indicated  Hep C, HIV: non-reactive 03/2023  HTN screening, not hypertensive  Diabetic screening: A1c 5.6 in 01/2024  UTD on Shingles shot, no record of PCV vaccine. Will need discussion at next visit.       Christel Chao M.D.  [employer]  PGY-3

## 2024-02-22 NOTE — PROGRESS NOTES
I have seen the patient, reviewed the Resident's history and physical. I have personally interviewed and examined the patient at bedside and: agree with the findings.     Gen: CAROLINE in NAD  CV: RRR

## 2024-03-07 NOTE — PROGRESS NOTES
Faculty addendum: Patient discussed with resident. Chart was reviewed including vitals, labs, etc. Care provided reasonable and necessary. I participated in the management of the patient and was immediately available throughout the encounter. Services were furnished in a primary care center located in the outpatient department of a [facility] hospital. I agree with the resident's findings and plan as documented in the resident's note.

## 2024-03-19 ENCOUNTER — PATIENT OUTREACH (OUTPATIENT)
Dept: ADMINISTRATIVE | Facility: HOSPITAL | Age: 69
End: 2024-03-19
Payer: MEDICARE

## 2024-03-19 NOTE — LETTER
AUTHORIZATION FOR RELEASE OF   CONFIDENTIAL INFORMATION    Dear Dr. Rainer Vasquez,    We are seeing Florence Swann, date of birth 1955, in the clinic at [facility]. Christel Chao MD is the patient's PCP. Florence Swann has an outstanding lab/procedure at the time we reviewed her chart. In order to help keep her health information updated, she has authorized us to request the following medical record(s):             ( X)  COLONOSCOPY REPORT and SURGICAL PATHOLOGY if available.                     Please fax records to [facility].                                   FAX Number: [phone]     If you have any questions, please contact DAVID Corrales  at [phone].           Patient Name: Florence Swann  : 1955  Patient Phone #: [phone]

## 2024-03-25 NOTE — PROGRESS NOTES
Health Maintenance Topic(s) Outreach Outcomes & Actions Taken:    Colorectal Cancer Screening - Outreach Outcomes & Actions Taken  : External Records Uploaded, Care Team Updated, & History Updated if Applicable       Additional Notes:

## 2024-04-03 ENCOUNTER — PATIENT MESSAGE (OUTPATIENT)
Dept: ADMINISTRATIVE | Facility: HOSPITAL | Age: 69
End: 2024-04-03
Payer: MEDICARE

## 2024-04-08 ENCOUNTER — HOSPITAL ENCOUNTER (OUTPATIENT)
Dept: RADIOLOGY | Facility: HOSPITAL | Age: 69
Discharge: HOME | End: 2024-04-08
Attending: STUDENT IN AN ORGANIZED HEALTH CARE EDUCATION/TRAINING PROGRAM
Payer: MEDICARE

## 2024-04-08 DIAGNOSIS — Z87.891 PERSONAL HISTORY OF NICOTINE DEPENDENCE: ICD-10-CM

## 2024-04-08 DIAGNOSIS — Z12.31 SCREENING MAMMOGRAM FOR BREAST CANCER: ICD-10-CM

## 2024-04-08 PROCEDURE — 71271 CT THORAX LUNG CANCER SCR C-: CPT | Mod: TC

## 2024-04-08 PROCEDURE — 77063 BREAST TOMOSYNTHESIS BI: CPT | Mod: 26,,, | Performed by: RADIOLOGY

## 2024-04-08 PROCEDURE — 77067 SCR MAMMO BI INCL CAD: CPT | Mod: 26,,, | Performed by: RADIOLOGY

## 2024-04-08 PROCEDURE — 77063 BREAST TOMOSYNTHESIS BI: CPT | Mod: TC

## 2024-04-11 ENCOUNTER — OFFICE VISIT (OUTPATIENT)
Dept: FAMILY MEDICINE | Facility: CLINIC | Age: 69
End: 2024-04-11
Payer: MEDICARE

## 2024-04-11 VITALS
HEART RATE: 56 BPM | TEMPERATURE: 98 F | SYSTOLIC BLOOD PRESSURE: 107 MMHG | BODY MASS INDEX: 29.03 KG/M2 | OXYGEN SATURATION: 98 % | DIASTOLIC BLOOD PRESSURE: 74 MMHG | HEIGHT: 69 IN | WEIGHT: 196 LBS | RESPIRATION RATE: 18 BRPM

## 2024-04-11 DIAGNOSIS — S21.002A WOUND OF LEFT BREAST, INITIAL ENCOUNTER: ICD-10-CM

## 2024-04-11 DIAGNOSIS — E83.51 HYPOCALCEMIA: Primary | ICD-10-CM

## 2024-04-11 DIAGNOSIS — K25.7 CHRONIC GASTRIC ULCER WITHOUT HEMORRHAGE AND WITHOUT PERFORATION: ICD-10-CM

## 2024-04-11 DIAGNOSIS — J44.9 CHRONIC OBSTRUCTIVE PULMONARY DISEASE, UNSPECIFIED COPD TYPE: ICD-10-CM

## 2024-04-11 LAB
ANION GAP SERPL CALC-SCNC: 7 MEQ/L
BUN SERPL-MCNC: 13.4 MG/DL (ref 9.8–20.1)
CALCIUM SERPL-MCNC: 9.6 MG/DL (ref 8.4–10.2)
CHLORIDE SERPL-SCNC: 108 MMOL/L (ref 98–107)
CO2 SERPL-SCNC: 26 MMOL/L (ref 23–31)
CREAT SERPL-MCNC: 0.69 MG/DL (ref 0.55–1.02)
CREAT/UREA NIT SERPL: 19
GFR SERPLBLD CREATININE-BSD FMLA CKD-EPI: >60 MLS/MIN/1.73/M2
GLUCOSE SERPL-MCNC: 84 MG/DL (ref 82–115)
POTASSIUM SERPL-SCNC: 4.3 MMOL/L (ref 3.5–5.1)
SODIUM SERPL-SCNC: 141 MMOL/L (ref 136–145)

## 2024-04-11 PROCEDURE — 36415 COLL VENOUS BLD VENIPUNCTURE: CPT | Performed by: STUDENT IN AN ORGANIZED HEALTH CARE EDUCATION/TRAINING PROGRAM

## 2024-04-11 PROCEDURE — 90715 TDAP VACCINE 7 YRS/> IM: CPT | Mod: PBBFAC

## 2024-04-11 PROCEDURE — 80048 BASIC METABOLIC PNL TOTAL CA: CPT | Performed by: STUDENT IN AN ORGANIZED HEALTH CARE EDUCATION/TRAINING PROGRAM

## 2024-04-11 PROCEDURE — 99215 OFFICE O/P EST HI 40 MIN: CPT | Mod: PBBFAC | Performed by: STUDENT IN AN ORGANIZED HEALTH CARE EDUCATION/TRAINING PROGRAM

## 2024-04-11 PROCEDURE — 90471 IMMUNIZATION ADMIN: CPT | Mod: PBBFAC

## 2024-04-11 RX ORDER — MISOPROSTOL 100 UG/1
100 TABLET ORAL 4 TIMES DAILY
Qty: 120 TABLET | Refills: 1 | Status: SHIPPED | OUTPATIENT
Start: 2024-04-11 | End: 2024-06-05

## 2024-04-11 RX ORDER — FAMOTIDINE 20 MG/1
20 TABLET, FILM COATED ORAL EVERY MORNING
Qty: 90 TABLET | Refills: 0 | Status: SHIPPED | OUTPATIENT
Start: 2024-04-11 | End: 2024-06-05

## 2024-04-11 RX ADMIN — TETANUS TOXOID, REDUCED DIPHTHERIA TOXOID AND ACELLULAR PERTUSSIS VACCINE, ADSORBED 0.5 ML: 5; 2.5; 8; 8; 2.5 SUSPENSION INTRAMUSCULAR at 10:04

## 2024-04-11 NOTE — PROGRESS NOTES
Chief Complaint  Follow-up (Bariatric Sx), Medication Refill (X 90 days), and Wound Check (Under left breast (From Mammogram))      History of Present Illness      Florence Swann is a 68 y.o. year old female presents to the clinic for f/u after revision of gastro-jejunostomy done by Dr. Shannon Greco on 3/12/24. Since then pt has been slowly introducing food items she was unable to eat in the past, doing well.   Pt also reports the mammogram pinched her skin and now has a linear wound under the breast. Has been applying hydrocortisone on the area.       PMH/Chronic issues (problems addressed at this visit are noted under A&P)  Osteoporosis of femoral neck and lumbar spine- DEXA 08/2021: left femoral neck T score -2.8. FRAX 14% for major osteoporotic fx, 3.3% for hip fracture. L3 T score -2.5 Started on alendronate, however discontinued due to esophageal strictures.   H/o colon polyps in 2015, will need repeat colonoscopy. Pt sees Dr. Maza, who recommended colonoscopy in 2025. Requested records from pt.    H/o gastric bypass surgery with post-operative complications. S/p revision surgery on 3/12/24 with Dr. Shannon Greco  S/p 2 balloon dilatation of esophageal strictures with Dr. Maza.   Microcytic anemia -on multivitamins, last hgb 12.3  1st degree AV block, sinus bradycardia. Seeing Dr. Perez cardiology. 24 hour event monitor confirming 1st degree AV block, sinus jamison with occasional PAC and junctional. Had lexiscan done 09/2023, normal perfusion study.   Lumbar disk disease, sees Dr. Cr with LOS, s/p fusion surgery on 12/20/23  COPD-seen on CT, will need formal PFT study. Ordered on 4/11        Health Maintenance  Lung CA screen: 04/2024 Bi-Rads 2, repeat annual screening  DEXA recent in 2021, osteoporosis. Amenable to prolia injections as alternative treatmen option to alendronate, but would like to defer until after complete resolution of her post-op pain from lumbar fusion in 12/2023.   Mammogram:  04/2023, Bi-RADS 1 bilat  Declines PCV 20, received shingles (dose #1) in 03/2023, dose #2 on 06/2023  S/p hysterectomy, no PAP indicated  TDaP booster 4/11/24        ROS filled out by patient prior to visit  Review of Systems   HENT:  Negative for hearing loss.    Eyes:  Negative for discharge.   Respiratory:  Negative for wheezing.    Cardiovascular:  Negative for chest pain and palpitations.   Gastrointestinal:  Negative for blood in stool, constipation, diarrhea and vomiting.   Genitourinary:  Negative for dysuria and hematuria.   Musculoskeletal:  Negative for neck pain.   Neurological:  Negative for weakness and headaches.   Endo/Heme/Allergies:  Negative for polydipsia.         Physical Exam  Vitals and nursing note reviewed.   Constitutional:       General: She is not in acute distress.  HENT:      Head:      Comments: Dentures noted, intact  Eyes:      Conjunctiva/sclera: Conjunctivae normal.   Cardiovascular:      Rate and Rhythm: Regular rhythm. Bradycardia present.      Heart sounds: Normal heart sounds.   Pulmonary:      Effort: Pulmonary effort is normal.      Breath sounds: Normal breath sounds.   Chest:      Comments: 10 cm linear under left breast with erythema and   Abdominal:      Palpations: Abdomen is soft.      Tenderness: There is no abdominal tenderness.   Musculoskeletal:      Right lower leg: No edema.      Left lower leg: No edema.   Neurological:      General: No focal deficit present.   Psychiatric:         Mood and Affect: Mood normal.         Vitals:    04/11/24 0915   BP: 107/74   Pulse: (!) 56   Resp: 18   Temp: 97.9 °F (36.6 °C)     Wt Readings from Last 2 Encounters:   04/11/24 88.9 kg (196 lb)   02/20/24 91.7 kg (202 lb 3.2 oz)         Current Outpatient Medications  Current Outpatient Medications   Medication Instructions    acetaminophen (TYLENOL ARTHRITIS PAIN ORAL) No dose, route, or frequency recorded.    famotidine (PEPCID) 20 mg, Oral, Every morning    hydrocortisone 2.5 %  cream Topical (Top), 2 times daily    miSOPROStoL (CYTOTEC) 100 mcg, Oral, 4 times daily    multivitamin-min-iron-FA-vit K (BARIATRIC MULTIVITAMINS) 45 mg iron- 800 mcg-120 mcg Cap Oral    ondansetron (ZOFRAN-ODT) 4 mg, Oral, Every 8 hours PRN             Assessment / Plan:    1. Hypocalcemia  - Basic Metabolic Panel  -Ca level normalized at 9.4  -pt is on calcium supplements 500mg twice daily  -will start prolia injections at next visit, continue to check calcium levels, she might need increase dose of calcium supplement    2. Chronic gastric ulcer  Refill sent  - miSOPROStoL (CYTOTEC) 100 MCG Tab; Take 1 tablet (100 mcg total) by mouth 4 (four) times daily.  Dispense: 120 tablet; Refill: 1    3. Chronic obstructive pulmonary disease, unspecified COPD type  Evident on most recent lung cancer screening CT  asymptomatic  - Complete PFT w/ bronchodilator; Future    4. Wound-left breast  -recommended Aquaphor ointment  -stop using steroid cream  -keep area dry with gauze and keep it from rubbing against bra  -area injured by mammogram table   -tetanus booster given    Follow up:    In 6 weeks, or earlier if needed.     Christel Chao M.D.  [facility]  PGY-3  Answers submitted by the patient for this visit:  Review of Systems Questionnaire (Submitted on 4/4/2024)  activity change: No  unexpected weight change: No  rhinorrhea: No  trouble swallowing: No  visual disturbance: No  chest tightness: No  polyuria: No  difficulty urinating: No  menstrual problem: No  joint swelling: No  arthralgias: No  confusion: No  dysphoric mood: No

## 2024-04-11 NOTE — PATIENT INSTRUCTIONS
Aquaphor ointment, which you can buy in [employer] or [employer] to apply under breast  Stop using the hydrocortisone cream

## 2024-04-24 ENCOUNTER — HOSPITAL ENCOUNTER (OUTPATIENT)
Dept: PULMONOLOGY | Facility: HOSPITAL | Age: 69
Discharge: HOME | End: 2024-04-24
Attending: STUDENT IN AN ORGANIZED HEALTH CARE EDUCATION/TRAINING PROGRAM
Payer: MEDICARE

## 2024-04-24 DIAGNOSIS — J44.9 CHRONIC OBSTRUCTIVE PULMONARY DISEASE, UNSPECIFIED COPD TYPE: ICD-10-CM

## 2024-04-24 PROCEDURE — 94726 PLETHYSMOGRAPHY LUNG VOLUMES: CPT

## 2024-04-24 PROCEDURE — 94729 DIFFUSING CAPACITY: CPT

## 2024-04-24 PROCEDURE — 94010 BREATHING CAPACITY TEST: CPT

## 2024-04-25 LAB
DLCO SINGLE BREATH LLN: 19.24
DLCO SINGLE BREATH PRE REF: 68.2 %
DLCO SINGLE BREATH REF: 24.97
DLCOC SBVA LLN: 3.08
DLCOC SBVA REF: 4.32
DLCOC SINGLE BREATH LLN: 19.24
DLCOC SINGLE BREATH REF: 24.97
DLCOVA LLN: 3.08
DLCOVA PRE REF: 74.9 %
DLCOVA PRE: 3.24 ML/(MIN*MMHG*L) (ref 3.08–5.56)
DLCOVA REF: 4.32
ERV LLN: -16449.24
ERV PRE REF: 234.2 %
ERV REF: 0.76
FEF 25 75 LLN: 1.07
FEF 25 75 PRE REF: 104.5 %
FEF 25 75 REF: 2.23
FEV1 FVC LLN: 65
FEV1 FVC PRE REF: 97.9 %
FEV1 FVC REF: 78
FEV1 LLN: 2
FEV1 PRE REF: 104.8 %
FEV1 REF: 2.71
FRCPLETH LLN: 2.17
FRCPLETH PREREF: 127.1 %
FRCPLETH REF: 2.99
FVC LLN: 2.6
FVC PRE REF: 106.1 %
FVC REF: 3.51
IVC PRE: 3.8 L (ref 2.6–4.47)
IVC SINGLE BREATH LLN: 2.6
IVC SINGLE BREATH PRE REF: 108.1 %
IVC SINGLE BREATH REF: 3.51
MVV LLN: 91
MVV PRE REF: 108.1 %
MVV REF: 107
PEF LLN: 4.74
PEF PRE REF: 115.2 %
PEF REF: 6.73
PRE DLCO: 17.02 ML/(MIN*MMHG) (ref 19.24–30.7)
PRE ERV: 1.79 L (ref -16449.24–16450.76)
PRE FEF 25 75: 2.33 L/S (ref 1.07–3.84)
PRE FET 100: 7.96 SEC
PRE FEV1 FVC: 76.27 % (ref 65.24–88.82)
PRE FEV1: 2.84 L (ref 2–3.39)
PRE FRC PL: 3.8 L (ref 2.17–3.81)
PRE FVC: 3.73 L (ref 2.6–4.47)
PRE MVV: 115.53 L/MIN (ref 90.87–122.95)
PRE PEF: 7.75 L/S (ref 4.74–8.72)
PRE RV: 2.01 L (ref 1.65–2.8)
PRE TLC: 5.74 L (ref 4.79–6.76)
RAW LLN: 3.06
RAW PRE REF: 76.8 %
RAW PRE: 2.35 CMH2O*S/L (ref 3.06–3.06)
RAW REF: 3.06
RV LLN: 1.65
RV PRE REF: 90.3 %
RV REF: 2.23
RVTLC LLN: 32
RVTLC PRE REF: 84.7 %
RVTLC PRE: 35.05 % (ref 31.81–50.99)
RVTLC REF: 41
TLC LLN: 4.79
TLC PRE REF: 99.4 %
TLC REF: 5.78
VA PRE: 5.26 L (ref 5.63–5.63)
VA SINGLE BREATH LLN: 5.63
VA SINGLE BREATH PRE REF: 93.4 %
VA SINGLE BREATH REF: 5.63
VC LLN: 2.6
VC PRE REF: 106.1 %
VC PRE: 3.73 L (ref 2.6–4.47)
VC REF: 3.51

## 2024-04-29 NOTE — PROGRESS NOTES
Faculty addendum: Patient discussed with resident. Chart was reviewed including vitals, labs, etc. Care provided reasonable and necessary. I participated in the management of the patient and was immediately available throughout the encounter. Services were furnished in a primary care center located in the outpatient department of a [facility] hospital. I agree with the resident's findings and plan as documented in the resident's note.

## 2024-04-30 ENCOUNTER — OFFICE VISIT (OUTPATIENT)
Dept: FAMILY MEDICINE | Facility: CLINIC | Age: 69
End: 2024-04-30
Payer: MEDICARE

## 2024-04-30 VITALS
OXYGEN SATURATION: 99 % | TEMPERATURE: 98 F | SYSTOLIC BLOOD PRESSURE: 128 MMHG | DIASTOLIC BLOOD PRESSURE: 81 MMHG | WEIGHT: 197.38 LBS | RESPIRATION RATE: 18 BRPM | HEART RATE: 47 BPM | HEIGHT: 69 IN | BODY MASS INDEX: 29.23 KG/M2

## 2024-04-30 DIAGNOSIS — E83.51 HYPOCALCEMIA: Primary | ICD-10-CM

## 2024-04-30 DIAGNOSIS — M81.0 OSTEOPOROSIS, UNSPECIFIED OSTEOPOROSIS TYPE, UNSPECIFIED PATHOLOGICAL FRACTURE PRESENCE: ICD-10-CM

## 2024-04-30 PROCEDURE — 99214 OFFICE O/P EST MOD 30 MIN: CPT | Mod: PBBFAC | Performed by: STUDENT IN AN ORGANIZED HEALTH CARE EDUCATION/TRAINING PROGRAM

## 2024-04-30 NOTE — PROGRESS NOTES
Chief Complaint  breathing test results       History of Present Illness  Florence Swann is a 68 y.o. year old female presents to the clinic for f/u on PFT and hypocalcemia.   Pt is feeling well, has no acute concerns.   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         PMH/Chronic issues (problems addressed at this visit are noted under A&P)  Osteoporosis of femoral neck and lumbar spine- DEXA 08/2021: left femoral neck T score -2.8. FRAX 14% for major osteoporotic fx, 3.3% for hip fracture. L3 T score -2.5 Started on alendronate, however discontinued due to esophageal strictures.   H/o colon polyps in 2015; Pt sees Dr. Maza, appt for repeat colonoscopy already scheduled, pending.   H/o gastric bypass surgery with post-operative complications. S/p revision surgery on 3/12/24 with Dr. Shannon Greco  S/p 2 balloon dilatation of esophageal strictures with Dr. Maza.   Microcytic anemia -on multivitamins, last hgb 12.3  1st degree AV block, sinus bradycardia. Seeing Dr. Perez cardiology. 24 hour event monitor confirming 1st degree AV block, sinus jamison with occasional PAC and junctional. Had lexiscan done 09/2023, normal perfusion study.   Lumbar disk disease, sees Dr. Cr with LOS, s/p fusion surgery on 12/20/23  COPD-seen on CT, formal PFT study 4/24/24 normal pulmonary function test.         Health Maintenance  Lung CA screen: 04/2024 Bi-Rads 2, repeat annual screening  DEXA recent in 2021, osteoporosis. Amenable to prolia injections as alternative treatmen option to alendronate  Mammogram: 04/2023, Bi-RADS 1 bilat  Declines PCV 20, received shingles (dose #1) in 03/2023, dose #2 on 06/2023  S/p hysterectomy, no PAP indicated  TDaP booster 4/11/24      Review of Systems   HENT:  Negative for hearing loss.    Eyes:  Negative for discharge.   Respiratory:  Negative for wheezing.    Cardiovascular:   Negative for chest pain and palpitations.   Gastrointestinal:  Negative for blood in stool, constipation, diarrhea and vomiting.   Genitourinary:  Negative for dysuria and hematuria.   Musculoskeletal:  Negative for neck pain.   Neurological:  Negative for weakness and headaches.   Endo/Heme/Allergies:  Negative for polydipsia.         Physical Exam  Vitals and nursing note reviewed.   Constitutional:       General: She is not in acute distress.  Cardiovascular:      Rate and Rhythm: Bradycardia present.      Heart sounds: Normal heart sounds.   Pulmonary:      Effort: Pulmonary effort is normal.      Breath sounds: Normal breath sounds.         Vitals:    04/30/24 0902   BP: 128/81   Pulse: (!) 47   Resp: 18   Temp: 97.7 °F (36.5 °C)     Wt Readings from Last 2 Encounters:   04/30/24 89.5 kg (197 lb 6.4 oz)   04/11/24 88.9 kg (196 lb)         Current Outpatient Medications  Current Outpatient Medications   Medication Instructions    acetaminophen (TYLENOL ARTHRITIS PAIN ORAL) No dose, route, or frequency recorded.    famotidine (PEPCID) 20 mg, Oral, Every morning    hydrocortisone 2.5 % cream Topical (Top), 2 times daily    miSOPROStoL (CYTOTEC) 100 mcg, Oral, 4 times daily    multivitamin-min-iron-FA-vit K (BARIATRIC MULTIVITAMINS) 45 mg iron- 800 mcg-120 mcg Cap Oral    ondansetron (ZOFRAN-ODT) 4 mg, Oral, Every 8 hours PRN             Assessment / Plan:    1. Hypocalcemia  -improved from 7.6 to 9.6 with supplementation with 500mg calcium chews twice daily    2. Osteoporosis, unspecified osteoporosis type, unspecified pathological fracture presence  -interested in Prolia injections, but she would like to defer to next provider, so she can have continuation of care and appropriate follow up.     Discussed with pt regarding her PFT results, print out copy given.     Follow up:    PRN. Thank you for allowing me to participate in this patient's care.     Christel Chao M.D.  [facility] PGY-3    Answers submitted by the  patient for this visit:  Review of Systems Questionnaire (Submitted on 4/29/2024)  activity change: No  unexpected weight change: No  neck pain: No  hearing loss: No  rhinorrhea: No  trouble swallowing: No  eye discharge: No  visual disturbance: No  chest tightness: No  wheezing: No  chest pain: No  palpitations: No  blood in stool: No  constipation: No  vomiting: No  diarrhea: No  polydipsia: No  polyuria: No  difficulty urinating: No  hematuria: No  menstrual problem: No  dysuria: No  joint swelling: No  arthralgias: No  headaches: No  weakness: No  confusion: No  dysphoric mood: No

## 2024-05-29 ENCOUNTER — TELEPHONE (OUTPATIENT)
Dept: FAMILY MEDICINE | Facility: CLINIC | Age: 69
End: 2024-05-29
Payer: MEDICARE

## 2024-06-05 ENCOUNTER — OFFICE VISIT (OUTPATIENT)
Dept: FAMILY MEDICINE | Facility: CLINIC | Age: 69
End: 2024-06-05
Payer: MEDICARE

## 2024-06-05 VITALS
HEART RATE: 55 BPM | WEIGHT: 201 LBS | DIASTOLIC BLOOD PRESSURE: 81 MMHG | RESPIRATION RATE: 17 BRPM | BODY MASS INDEX: 29.77 KG/M2 | SYSTOLIC BLOOD PRESSURE: 125 MMHG | OXYGEN SATURATION: 99 % | HEIGHT: 69 IN

## 2024-06-05 DIAGNOSIS — Z98.84 S/P GASTRIC BYPASS: ICD-10-CM

## 2024-06-05 DIAGNOSIS — R79.9 ABNORMAL FINDING OF BLOOD CHEMISTRY, UNSPECIFIED: ICD-10-CM

## 2024-06-05 DIAGNOSIS — R00.1 BRADYCARDIA: ICD-10-CM

## 2024-06-05 DIAGNOSIS — Z00.00 MEDICARE ANNUAL WELLNESS VISIT, SUBSEQUENT: Primary | ICD-10-CM

## 2024-06-05 DIAGNOSIS — Z78.0 POSTMENOPAUSAL: ICD-10-CM

## 2024-06-05 DIAGNOSIS — H91.93 BILATERAL HEARING LOSS, UNSPECIFIED HEARING LOSS TYPE: ICD-10-CM

## 2024-06-05 DIAGNOSIS — Z13.820 SCREENING FOR OSTEOPOROSIS: ICD-10-CM

## 2024-06-05 PROCEDURE — G0439 PPPS, SUBSEQ VISIT: HCPCS | Mod: ,,, | Performed by: FAMILY MEDICINE

## 2024-06-05 NOTE — PROGRESS NOTES
Patient ID: [id number]     Chief Complaint: Establish Care        HPI:     Florence Swann is a 68 y.o. female here today for a Medicare Wellness, here to establish care.   The patient presents for well adult exam.    The patient's general health status is described as good.    The patient's diet is described as balanced.    Exercise: occasional.    Associated symptoms consist of denies weight loss, denies weight gain, denies fatigue, denies headache, denies hearing loss and denies vision changes.    Additional pertinent history: last dental exam: has dentures, last eye exam: 11/2023, goes annually to [facility] (wears contacts)  She is due for annual labs.   Patient is without any other complaints today.       PMH/Chronic issues (problems addressed at this visit are noted under A&P)  H/o colon polyps in 2015, will need repeat colonoscopy. Pt sees Dr. Maza, who recommended colonoscopy in 2025.   H/o gastric bypass surgery with post-operative complications. She is s/p gastric bypass revision with Bariatric Surgery (Dr. Terri Greco) on 03/12/2024.  S/p 2 balloon dilatation of esophageal strictures with Dr. Maza.   Microcytic anemia -on multivitamins, last hgb 12.3  1st degree AV block, sinus bradycardia. Seeing Dr. Perez - Cardiology. 24 hour event monitor confirming 1st degree AV block, sinus jamison with occasional PAC and junctional. Had lexiscan done 09/2023, normal perfusion study. She sees him annually.   Lumbar disk disease, sees Dr. Cr with LOS, s/p fusion surgery on 12/20/23     Health Maintenance  Lung CA screen: 04/2024, benign findings, repeat annual screening in 04/2025  DEXA recent in 2021, osteoporosis, she needs scheduled.    Mammogram: 04/08/2024, WNL.  Declines PCV 20, RSV vaccine, and COVID-19 vaccine, she is UTD on all other vaccines. S/p hysterectomy, no PAP indicated    denies Urinary leakage.  denies Recent falls or balance difficulty.   admits to Daily exercise or physical  activity.  denies Depression, stress, anxiety, or emotional lability.   denies The need for healthcare treatment including a cane/walker, blood pressure monitoring, or regular hearing tests. She reports some bilateral hearing loss, would like referral for hearing test.     Patient Care Team:  Tracy Merino MD as PCP - General (Family Medicine)  Rainer Vasquez MD (Gastroenterology)  Owen Maza MD as Consulting Physician (Gastroenterology)     Opioid Screening: Patient medication list reviewed, patient is not taking prescription opioids. Patient is not using additional opioids than prescribed. Patient is at low risk of substance abuse based on this opioid use history.      Advance Care Planning     Date: 06/05/2024    Power of   I initiated the process of voluntary advance care planning today and explained the importance of this process to the patient.  I introduced the concept of advance directives to the patient, as well. Then the patient received detailed information about the importance of designating a Health Care Power of  (HCPOA). She was also instructed to communicate with this person about their wishes for future healthcare, should she become sick and lose decision-making capacity. The patient has previously appointed a HCPOA. After our discussion, the patient has decided to complete a HCPOA and has appointed her son, health care agent:  Ajith Samuel  & health care agent number:  [phone]  I spent a total time of 5 minutes discussing this issue with the patient.                    -------------------------------------    Currently smokes tobacco    Osteoporosis        Past Surgical History:   Procedure Laterality Date    APPENDECTOMY      CHOLECYSTECTOMY      COLONOSCOPY  12/07/2015    Dr. Rainer Vasquez    GASTRIC BYPASS      HYSTERECTOMY      LUMBAR FUSION      L4, L5, S1       Review of patient's allergies indicates:   Allergen Reactions    Banana Swelling     Latex, natural rubber Hives and Itching    Morphine Hallucinations    Opioids - morphine analogues Anxiety and Hallucinations       Outpatient Medications Marked as Taking for the 24 encounter (Office Visit) with Tracy Merino MD   Medication Sig Dispense Refill    acetaminophen (TYLENOL ARTHRITIS PAIN ORAL)       hydrocortisone 2.5 % cream Apply topically 2 (two) times daily. 3.5 g 0    multivitamin-min-iron-FA-vit K (BARIATRIC MULTIVITAMINS) 45 mg iron- 800 mcg-120 mcg Cap Take by mouth.         Social History     Socioeconomic History    Marital status:    Tobacco Use    Smoking status: Former     Current packs/day: 0.00     Types: Cigarettes     Quit date: 2023     Years since quittin.4    Smokeless tobacco: Never   Substance and Sexual Activity    Alcohol use: Never    Drug use: Never    Sexual activity: Not Currently     Social Determinants of Health     Financial Resource Strain: Low Risk  (2023)    Overall Financial Resource Strain (CARDIA)     Difficulty of Paying Living Expenses: Not hard at all   Food Insecurity: No Food Insecurity (2024)    Hunger Vital Sign     Worried About Running Out of Food in the Last Year: Never true     Ran Out of Food in the Last Year: Never true   Transportation Needs: No Transportation Needs (2023)    PRAPARE - Transportation     Lack of Transportation (Medical): No     Lack of Transportation (Non-Medical): No   Physical Activity: Inactive (2023)    Exercise Vital Sign     Days of Exercise per Week: 0 days     Minutes of Exercise per Session: 0 min   Stress: No Stress Concern Present (2023)    [employer] - Occupational Stress Questionnaire     Feeling of Stress : Not at all   Housing Stability: Low Risk  (2023)    Housing Stability Vital Sign     Unable to Pay for Housing in the Last Year: No     Number of Places Lived in the Last Year: 1     Unstable Housing in the Last Year: No       "  Family History   Problem Relation Name Age of Onset    Thyroid disease Mother      Heart disease Mother      Diabetes Mother      Cancer Father          leukemia    Diabetes Father          Subjective:     ROS      See HPI for details    Constitutional: Denies Change in appetite. Denies Chills. Denies Fever. Denies Night sweats.  Eye: Denies Blurred vision. Denies Discharge. Denies Eye pain.  ENT: Reports Decreased hearing. Denies Sore throat. Denies Swollen glands.  Respiratory: Denies Cough. Denies Shortness of breath. Denies Shortness of breath with exertion. Denies Wheezing.  Cardiovascular: Denies Chest pain at rest. Denies Chest pain with exertion. Denies Irregular heartbeat. Denies Palpitations.  Gastrointestinal: Denies Abdominal pain. Denies Diarrhea. Denies Nausea. Denies Vomiting. Denies Hematemesis or Hematochezia.  Genitourinary: Denies Dysuria. Denies Urinary frequency. Denies Urinary urgency. Denies Blood in urine.  Endocrine: Denies Cold intolerance. Denies Excessive thirst. Denies Heat intolerance. Denies Weight loss. Denies Weight gain.  Musculoskeletal: Denies Painful joints. Denies Weakness.  Integumentary: Denies Rash. Denies Itching. Denies Dry skin.  Neurologic: Denies Dizziness. Denies Fainting. Denies Headache.  Psychiatric: Denies Depression. Denies Anxiety. Denies Suicidal/Homicidal ideations.    All Other ROS: Negative except as stated in HPI.       Objective:     /81 (BP Location: Right arm, Patient Position: Sitting, BP Method: Large (Automatic))   Pulse (!) 55   Resp 17   Ht 5' 9" (1.753 m)   Wt 91.2 kg (201 lb)   SpO2 99%   BMI 29.68 kg/m²     Physical Exam    General: Alert and oriented, No acute distress. Overweight.   Head: Normocephalic, Atraumatic.  Eye: Pupils are equal, round and reactive to light, Extraocular movements are intact, Sclera non-icteric.  Ears/Nose/Throat: Normal, Mucosa moist,Clear.  Neck/Thyroid: Supple, Non-tender, No carotid bruit, No palpable " thyromegaly or thyroid nodule, No lymphadenopathy, No JVD, Full range of motion.  Respiratory: Clear to auscultation bilaterally; No wheezes, rales or rhonchi,Non-labored respirations, Symmetrical chest wall expansion.  Cardiovascular: Bradycardia, Regular rhythm, S1/S2 normal, No murmurs, rubs or gallops.  Gastrointestinal: Soft, Non-tender, Non-distended, Normal bowel sounds, No palpable organomegaly.  Musculoskeletal: Normal range of motion.  Integumentary: Warm, Dry, Intact, No suspicious lesions or rashes.  Extremities: No clubbing, cyanosis or edema  Neurologic: No focal deficits, Cranial Nerves II-XII are grossly intact, Motor strength normal upper and lower extremities, Sensory exam intact.  Psychiatric: Normal interaction, Coherent speech, Euthymic mood, Appropriate affect       Assessment:       ICD-10-CM ICD-9-CM   1. Medicare annual wellness visit, subsequent  Z00.00 V70.0   2. Postmenopausal  Z78.0 V49.81   3. Screening for osteoporosis  Z13.820 V82.81   4. S/P gastric bypass  Z98.84 V45.86   5. Bradycardia  R00.1 427.89   6. Abnormal finding of blood chemistry, unspecified  R79.9 790.6   7. Bilateral hearing loss, unspecified hearing loss type  H91.93 389.9        Plan:       Medicare Annual Wellness and Personalized Prevention Plan:     Fall Risk + Home Safety + Hearing Impairment + Depression Screen + Cognitive Impairment Screen + Health Risk Assessment all reviewed.          No data to display                  6/5/2024     1:16 PM 4/30/2024     9:02 AM 4/11/2024     9:16 AM 2/20/2024     9:32 AM 1/5/2024     1:05 PM 12/11/2023     9:40 AM 9/26/2023     9:52 AM   Depression Screeening PHQ2   Over the last two weeks how often have you been bothered by little interest or pleasure in doing things 0 0 0 0 0 0 0   Over the last two weeks how often have you been bothered by feeling down, depressed or hopeless 0 0 0 0 0 0 0   PHQ-2 Total Score 0 0 0 0 0 0 0         6/5/2024     1:30 PM 4/30/2024     9:20 AM  4/11/2024     9:20 AM 1/5/2024     1:20 PM 12/11/2023     9:40 AM 8/8/2023     9:40 AM 6/21/2023     9:00 AM   Fall Risk Assessment - Outpatient   Mobility Status Ambulatory Ambulatory Ambulatory Ambulatory Ambulatory Ambulatory Ambulatory   Number of falls 0 0 0 0 0 0 0   Identified as fall risk False False False False False False False             What [age] 65-69  Are you male or female?: Female  During the past four weeks, how much have you been bothered by emotional problems such as feeling anxious, depressed, irritable, sad, or downhearted and blue?: Not at all  During the past five weeks, has your physical and/or emotional health limited your social activities with family, friends, neighbors, or groups?: Not at all  During the past four weeks, how much bodily pain have you generally had?: No pain  During the past four weeks, was someone available to help if you needed and wanted help?: Yes, as much as I wanted  During the past four weeks, what was the hardest physical activity you could do for at least two minutes?: Moderate  Can you get to places out of walking distance without help?  (For example, can you travel alone on buses or taxis, or drive your own car?): Yes  Can you go shopping for groceries or clothes without someone's help?: Yes  Can you prepare your own meals?: Yes  Can you do your own housework without help?: Yes  Because of any health problems, do you need the help of another person with your personal care needs such as eating, bathing, dressing, or getting around the house?: No  Can you handle your own money without help?: Yes  During the past four weeks, how would you rate your health in general?: Good  How have things been going for you during the past four weeks?: Pretty well  Are you having difficulties driving your car?: No  Do you always fasten your seat belt when you are in a car?: Yes, usually  How often in the past four weeks have you been bothered by falling or dizzy when  standing up?: Never  How often in the past four weeks have you been bothered by sexual problems?: Never  How often in the past four weeks have you been bothered by trouble eating well?: Never  How often in the past four weeks have you been bothered by teeth or denture problems?: Never  How often in the past four weeks have you been bothered with problems using the telephone?: Never  How often in the past four weeks have you been bothered by tiredness or fatigue?: Never  Have you fallen two or more times in the past year?: No  Are you afraid of falling?: Yes  Are you a smoker?: No  During the past four weeks, how many drinks of wine, beer, or other alcoholic beverages did you have?: No alcohol at all  Do you exercise for about 20 minutes three or more days a week?: Yes, some of the time  Have you been given any information to help you with hazards in your house that might hurt you?: Yes  Have you been given any information to help you with keeping track of your medications?: Yes  How often do you have trouble taking medicines the way you've been told to take them?: I always take them as prescribed  How confident are you that you can control and manage most of your health problems?: Very confident  What is your race? (Check all that apply.):                  Alcohol/Tobacco Use - Stressed importance of smoking cessation and limiting alcohol intake.  CVD Risk Factors - Reviewed  Obesity/Physical Activity - Normal BMI. Encouraged daily 30 minute physical activity x 5 days per week.      Health Maintenance Topics with due status: Not Due       Topic Last Completion Date    Colorectal Cancer Screening 12/07/2015    Hemoglobin A1c (Diabetic Prevention Screening) 01/26/2024    Lipid Panel 01/26/2024    Mammogram 04/08/2024    TETANUS VACCINE 04/11/2024    Influenza Vaccine Not Due        Vaccinations -   Immunization History   Administered Date(s) Administered    Tdap 04/11/2024    Zoster Recombinant 03/21/2023,  06/21/2023          1. Medicare annual wellness visit, subsequent  - CBC Auto Differential; Future  - Comprehensive Metabolic Panel; Future  - Hemoglobin A1C; Future  - Lipid Panel; Future  - TSH; Future  - Urinalysis, Reflex to Urine Culture; Future  - Will treat pending lab results. Monthly breast self exam encouraged. Diet, exercise, and 10% weight loss encouraged. Keep appointment for dental exams x q6 months as scheduled. Keep appointment for annual eye exam as scheduled. Keep appointment with specialists as scheduled. Notify M.D. or ER if temp greater than 100.4, or any acute illness.      2. Postmenopausal  - DXA Bone Density Axial Skeleton 1 or more sites; Future    3. Screening for osteoporosis  - DXA Bone Density Axial Skeleton 1 or more sites; Future    4. S/P gastric bypass  - Vitamin B12; Future  - Vitamin B1, WB; Future  - Iron and TIBC; Future  - Vitamin D; Future  - Daily MVI. Continue followup with Bariatric Surgery as scheduled.     5. Bradycardia  - Asymptomatic, continue followup with Cardiology as scheduled.     6. Abnormal finding of blood chemistry, unspecified  - CBC Auto Differential; Future  - Hemoglobin A1C; Future  - Iron and TIBC; Future    7. Bilateral hearing loss, unspecified hearing loss type  - Ambulatory referral/consult to Audiology; Future for hearing evaluation        Medication List with Changes/Refills   Current Medications    ACETAMINOPHEN (TYLENOL ARTHRITIS PAIN ORAL)           Start Date: --        End Date: --    HYDROCORTISONE 2.5 % CREAM    Apply topically 2 (two) times daily.       Start Date: 2/20/2024 End Date: --    MULTIVITAMIN-MIN-IRON-FA-VIT K (BARIATRIC MULTIVITAMINS) 45 MG IRON- 800 MCG-120 MCG CAP    Take by mouth.       Start Date: --        End Date: --   Discontinued Medications    FAMOTIDINE (PEPCID) 20 MG TABLET    Take 1 tablet (20 mg total) by mouth every morning.       Start Date: 4/11/2024 End Date: 6/5/2024    MISOPROSTOL (CYTOTEC) 100 MCG TAB    Take  1 tablet (100 mcg total) by mouth 4 (four) times daily.       Start Date: 4/11/2024 End Date: 6/5/2024    ONDANSETRON (ZOFRAN-ODT) 4 MG TBDL    Take 1 tablet (4 mg total) by mouth every 8 (eight) hours as needed.       Start Date: 1/5/2024  End Date: 6/5/2024          The patient's Health Maintenance was reviewed and the following appears to be due at this time:   Health Maintenance Due   Topic Date Due    DEXA Scan  08/24/2024         Follow up in about 1 year (around 6/5/2025) for Wellness.

## 2024-06-07 ENCOUNTER — LAB VISIT (OUTPATIENT)
Dept: LAB | Facility: HOSPITAL | Age: 69
End: 2024-06-07
Attending: FAMILY MEDICINE
Payer: MEDICARE

## 2024-06-07 DIAGNOSIS — R79.9 ABNORMAL FINDING OF BLOOD CHEMISTRY, UNSPECIFIED: ICD-10-CM

## 2024-06-07 DIAGNOSIS — Z98.84 S/P GASTRIC BYPASS: ICD-10-CM

## 2024-06-07 DIAGNOSIS — E61.1 IRON DEFICIENCY: ICD-10-CM

## 2024-06-07 DIAGNOSIS — R82.71 BACTERIA IN URINE: Primary | ICD-10-CM

## 2024-06-07 DIAGNOSIS — Z00.00 MEDICARE ANNUAL WELLNESS VISIT, SUBSEQUENT: ICD-10-CM

## 2024-06-07 LAB
25(OH)D3+25(OH)D2 SERPL-MCNC: 31 NG/ML (ref 30–80)
ALBUMIN SERPL-MCNC: 3.5 G/DL (ref 3.4–4.8)
ALBUMIN/GLOB SERPL: 1 RATIO (ref 1.1–2)
ALP SERPL-CCNC: 89 UNIT/L (ref 40–150)
ALT SERPL-CCNC: 15 UNIT/L (ref 0–55)
ANION GAP SERPL CALC-SCNC: 10 MEQ/L
AST SERPL-CCNC: 16 UNIT/L (ref 5–34)
BACTERIA #/AREA URNS AUTO: ABNORMAL /HPF
BASOPHILS # BLD AUTO: 0.09 X10(3)/MCL
BASOPHILS NFR BLD AUTO: 1.9 %
BILIRUB SERPL-MCNC: 0.7 MG/DL
BILIRUB UR QL STRIP.AUTO: NEGATIVE
BUN SERPL-MCNC: 12.5 MG/DL (ref 9.8–20.1)
CALCIUM SERPL-MCNC: 9.2 MG/DL (ref 8.4–10.2)
CHLORIDE SERPL-SCNC: 108 MMOL/L (ref 98–107)
CHOLEST SERPL-MCNC: 218 MG/DL
CHOLEST/HDLC SERPL: 3 {RATIO} (ref 0–5)
CLARITY UR: CLEAR
CO2 SERPL-SCNC: 25 MMOL/L (ref 23–31)
COLOR UR AUTO: ABNORMAL
CREAT SERPL-MCNC: 0.74 MG/DL (ref 0.55–1.02)
CREAT/UREA NIT SERPL: 17
EOSINOPHIL # BLD AUTO: 0.18 X10(3)/MCL (ref 0–0.9)
EOSINOPHIL NFR BLD AUTO: 3.8 %
ERYTHROCYTE [DISTWIDTH] IN BLOOD BY AUTOMATED COUNT: 16.5 % (ref 11.5–17)
EST. AVERAGE GLUCOSE BLD GHB EST-MCNC: 108.3 MG/DL
GFR SERPLBLD CREATININE-BSD FMLA CKD-EPI: >60 ML/MIN/1.73/M2
GLOBULIN SER-MCNC: 3.4 GM/DL (ref 2.4–3.5)
GLUCOSE SERPL-MCNC: 89 MG/DL (ref 82–115)
GLUCOSE UR QL STRIP: NORMAL
HBA1C MFR BLD: 5.4 %
HCT VFR BLD AUTO: 39.3 % (ref 37–47)
HDLC SERPL-MCNC: 67 MG/DL (ref 35–60)
HGB BLD-MCNC: 12.4 G/DL (ref 12–16)
HGB UR QL STRIP: NEGATIVE
HYALINE CASTS #/AREA URNS LPF: ABNORMAL /LPF
IMM GRANULOCYTES # BLD AUTO: 0.01 X10(3)/MCL (ref 0–0.04)
IMM GRANULOCYTES NFR BLD AUTO: 0.2 %
IRON SATN MFR SERPL: 13 % (ref 20–50)
IRON SERPL-MCNC: 47 UG/DL (ref 50–170)
KETONES UR QL STRIP: NEGATIVE
LDLC SERPL CALC-MCNC: 137 MG/DL (ref 50–140)
LEUKOCYTE ESTERASE UR QL STRIP: NEGATIVE
LYMPHOCYTES # BLD AUTO: 1.6 X10(3)/MCL (ref 0.6–4.6)
LYMPHOCYTES NFR BLD AUTO: 33.6 %
MCH RBC QN AUTO: 26.8 PG (ref 27–31)
MCHC RBC AUTO-ENTMCNC: 31.6 G/DL (ref 33–36)
MCV RBC AUTO: 84.9 FL (ref 80–94)
MONOCYTES # BLD AUTO: 0.49 X10(3)/MCL (ref 0.1–1.3)
MONOCYTES NFR BLD AUTO: 10.3 %
MUCOUS THREADS URNS QL MICRO: ABNORMAL /LPF
NEUTROPHILS # BLD AUTO: 2.39 X10(3)/MCL (ref 2.1–9.2)
NEUTROPHILS NFR BLD AUTO: 50.2 %
NITRITE UR QL STRIP: NEGATIVE
NRBC BLD AUTO-RTO: 0 %
PH UR STRIP: 5.5 [PH]
PLATELET # BLD AUTO: 358 X10(3)/MCL (ref 130–400)
PMV BLD AUTO: 9.9 FL (ref 7.4–10.4)
POTASSIUM SERPL-SCNC: 4.3 MMOL/L (ref 3.5–5.1)
PROT SERPL-MCNC: 6.9 GM/DL (ref 5.8–7.6)
PROT UR QL STRIP: NEGATIVE
RBC # BLD AUTO: 4.63 X10(6)/MCL (ref 4.2–5.4)
RBC #/AREA URNS AUTO: ABNORMAL /HPF
SODIUM SERPL-SCNC: 143 MMOL/L (ref 136–145)
SP GR UR STRIP.AUTO: 1.01 (ref 1–1.03)
SQUAMOUS #/AREA URNS LPF: ABNORMAL /HPF
TIBC SERPL-MCNC: 327 UG/DL (ref 70–310)
TIBC SERPL-MCNC: 374 UG/DL (ref 250–450)
TRANSFERRIN SERPL-MCNC: 356 MG/DL (ref 173–360)
TRIGL SERPL-MCNC: 68 MG/DL (ref 37–140)
TSH SERPL-ACNC: 1.41 UIU/ML (ref 0.35–4.94)
UROBILINOGEN UR STRIP-ACNC: NORMAL
VIT B12 SERPL-MCNC: 297 PG/ML (ref 213–816)
VLDLC SERPL CALC-MCNC: 14 MG/DL
WBC # SPEC AUTO: 4.76 X10(3)/MCL (ref 4.5–11.5)
WBC #/AREA URNS AUTO: ABNORMAL /HPF

## 2024-06-07 PROCEDURE — 84425 ASSAY OF VITAMIN B-1: CPT

## 2024-06-07 PROCEDURE — 81001 URINALYSIS AUTO W/SCOPE: CPT

## 2024-06-07 PROCEDURE — 84443 ASSAY THYROID STIM HORMONE: CPT

## 2024-06-07 PROCEDURE — 83540 ASSAY OF IRON: CPT

## 2024-06-07 PROCEDURE — 82306 VITAMIN D 25 HYDROXY: CPT

## 2024-06-07 PROCEDURE — 80061 LIPID PANEL: CPT

## 2024-06-07 PROCEDURE — 82607 VITAMIN B-12: CPT

## 2024-06-07 PROCEDURE — 36415 COLL VENOUS BLD VENIPUNCTURE: CPT

## 2024-06-07 PROCEDURE — 85025 COMPLETE CBC W/AUTO DIFF WBC: CPT

## 2024-06-07 PROCEDURE — 83036 HEMOGLOBIN GLYCOSYLATED A1C: CPT

## 2024-06-07 PROCEDURE — 80053 COMPREHEN METABOLIC PANEL: CPT

## 2024-06-10 ENCOUNTER — TELEPHONE (OUTPATIENT)
Dept: FAMILY MEDICINE | Facility: CLINIC | Age: 69
End: 2024-06-10
Payer: MEDICARE

## 2024-06-10 NOTE — TELEPHONE ENCOUNTER
----- Message from Tracy Merino MD sent at 6/7/2024 12:51 PM CDT -----  She has trace bacteria in her urine, order to add urine culture to labs is in file to evaluate for bacterial urinary tract infection. Iron level is mildly low, 47, normal: , needs iron rich diet and over the counter iron supplement as directed, recheck CBC and iron level in 4-6 weeks. Vitamin B1 level is pending. Remaining labs are essentially normal.

## 2024-06-11 ENCOUNTER — LAB VISIT (OUTPATIENT)
Dept: LAB | Facility: HOSPITAL | Age: 69
End: 2024-06-11
Attending: FAMILY MEDICINE
Payer: MEDICARE

## 2024-06-11 DIAGNOSIS — R82.71 BACTERIA IN URINE: ICD-10-CM

## 2024-06-11 PROCEDURE — 87086 URINE CULTURE/COLONY COUNT: CPT

## 2024-06-12 LAB — VIT B1 BLD-SCNC: 129 NMOL/L (ref 70–180)

## 2024-06-13 ENCOUNTER — PATIENT MESSAGE (OUTPATIENT)
Dept: FAMILY MEDICINE | Facility: CLINIC | Age: 69
End: 2024-06-13
Payer: MEDICARE

## 2024-06-13 ENCOUNTER — TELEPHONE (OUTPATIENT)
Dept: FAMILY MEDICINE | Facility: CLINIC | Age: 69
End: 2024-06-13
Payer: MEDICARE

## 2024-06-13 ENCOUNTER — HOSPITAL ENCOUNTER (OUTPATIENT)
Dept: RADIOLOGY | Facility: HOSPITAL | Age: 69
Discharge: HOME | End: 2024-06-13
Attending: FAMILY MEDICINE
Payer: MEDICARE

## 2024-06-13 DIAGNOSIS — Z13.820 SCREENING FOR OSTEOPOROSIS: ICD-10-CM

## 2024-06-13 DIAGNOSIS — M81.0 OSTEOPOROSIS WITHOUT CURRENT PATHOLOGICAL FRACTURE, UNSPECIFIED OSTEOPOROSIS TYPE: Primary | ICD-10-CM

## 2024-06-13 DIAGNOSIS — Z78.0 POSTMENOPAUSAL: ICD-10-CM

## 2024-06-13 LAB — BACTERIA UR CULT: NORMAL

## 2024-06-13 PROCEDURE — 77080 DXA BONE DENSITY AXIAL: CPT | Mod: TC

## 2024-06-13 NOTE — TELEPHONE ENCOUNTER
----- Message from Tracy Merino MD sent at 6/13/2024  1:02 PM CDT -----  DEXA scan shows osteoporosis (thinning of her bones) of her left arm and increases her risk of an upper extremity fracture, I placed a referral to an Endocrinology for evaluation for Prolia injections every 6 months to help strengthen her bones, she will also need to take an over the counter Calcium plus Vit D supplement as directed, like Os-jena D or Caltrate D. Will need to repeat DEXA scan in 06/2025.

## 2024-06-13 NOTE — TELEPHONE ENCOUNTER
----- Message from Tracy Merino MD sent at 6/13/2024 10:29 AM CDT -----  Final urine culture is negative for bacterial urinary tract infection.

## 2024-06-20 ENCOUNTER — TELEPHONE (OUTPATIENT)
Dept: FAMILY MEDICINE | Facility: CLINIC | Age: 69
End: 2024-06-20
Payer: MEDICARE

## 2024-06-20 NOTE — TELEPHONE ENCOUNTER
----- Message from Owen Cheney sent at 6/20/2024 10:40 AM CDT -----  .Type:  Needs Medical Advice    Who Called: Florence   Symptoms (please be specific):    How long has patient had these symptoms:    Pharmacy name and phone #:    Would the patient rather a call back or a response via [facility]?   Best Call Back Number: [phone]  Additional Information: Patient requested to spoke with the nurse about a referral she needed the doctor was suppose to set it up for her. Referral for osteoporosis problem she has.

## 2024-06-27 ENCOUNTER — TELEPHONE (OUTPATIENT)
Dept: FAMILY MEDICINE | Facility: CLINIC | Age: 69
End: 2024-06-27
Payer: MEDICARE

## 2024-06-27 NOTE — TELEPHONE ENCOUNTER
----- Message from Geo Crawford sent at 6/27/2024  9:55 AM CDT -----  .Who Called: Kj with Dr. Santo office    Does the patient already have the specialty appointment scheduled?:no  If yes, what is the date of that appointment?:n/a  Referral to What Specialty:Endo  Reason for Referral:n/a  Does the patient want the referral with a specific physician?:yes  If yes, which provider? Dr. Santo:   Is the specialist an [facility]?:n/a    Preferred Method of Contact: Phone Call  Patient's Preferred Phone Number on File: [phone]  Best Call Back Number, if different:  Additional Information: Called informing new referral needed including CBC, CMP and Vitamin D with Osteo report. Original referral closed out. New referral requested.

## 2024-07-01 ENCOUNTER — TELEPHONE (OUTPATIENT)
Dept: FAMILY MEDICINE | Facility: CLINIC | Age: 69
End: 2024-07-01
Payer: MEDICARE

## 2024-07-01 ENCOUNTER — DOCUMENTATION ONLY (OUTPATIENT)
Dept: FAMILY MEDICINE | Facility: CLINIC | Age: 69
End: 2024-07-01
Payer: MEDICARE

## 2024-07-01 NOTE — TELEPHONE ENCOUNTER
----- Message from Geo Crawford sent at 7/1/2024  8:33 AM CDT -----  .Who Called: Florence Swann    Caller is requesting assistance/information from provider's office.    Symptoms (please be specific): n/a   How long has patient had these symptoms:  n/a  List of preferred pharmacies on file (remove unneeded): [unfilled]  If different, enter pharmacy into here including location and phone number: n/a      Preferred Method of Contact: Phone Call  Patient's Preferred Phone Number on File: [phone]   Best Call Back Number, if different:  Additional Information: Pt.calling f/u on referrals for bone referral and Audiology referral. Pt. Stated she hasn't been contacted by any to schedule.

## 2024-07-12 ENCOUNTER — LAB VISIT (OUTPATIENT)
Dept: LAB | Facility: HOSPITAL | Age: 69
End: 2024-07-12
Attending: FAMILY MEDICINE
Payer: MEDICARE

## 2024-07-12 DIAGNOSIS — E61.1 IRON DEFICIENCY: ICD-10-CM

## 2024-07-12 DIAGNOSIS — E61.1 IRON DEFICIENCY: Primary | ICD-10-CM

## 2024-07-12 DIAGNOSIS — K43.2 RECURRENT VENTRAL HERNIA: Primary | ICD-10-CM

## 2024-07-12 DIAGNOSIS — Z98.84 S/P GASTRIC BYPASS: ICD-10-CM

## 2024-07-12 LAB
BASOPHILS # BLD AUTO: 0.09 X10(3)/MCL
BASOPHILS NFR BLD AUTO: 1.9 %
EOSINOPHIL # BLD AUTO: 0.43 X10(3)/MCL (ref 0–0.9)
EOSINOPHIL NFR BLD AUTO: 9.1 %
ERYTHROCYTE [DISTWIDTH] IN BLOOD BY AUTOMATED COUNT: 17.5 % (ref 11.5–17)
HCT VFR BLD AUTO: 38.5 % (ref 37–47)
HGB BLD-MCNC: 12.5 G/DL (ref 12–16)
IMM GRANULOCYTES # BLD AUTO: 0 X10(3)/MCL (ref 0–0.04)
IMM GRANULOCYTES NFR BLD AUTO: 0 %
IRON SATN MFR SERPL: 13 % (ref 20–50)
IRON SERPL-MCNC: 46 UG/DL (ref 50–170)
LYMPHOCYTES # BLD AUTO: 1.42 X10(3)/MCL (ref 0.6–4.6)
LYMPHOCYTES NFR BLD AUTO: 30.1 %
MCH RBC QN AUTO: 27.6 PG (ref 27–31)
MCHC RBC AUTO-ENTMCNC: 32.5 G/DL (ref 33–36)
MCV RBC AUTO: 85 FL (ref 80–94)
MONOCYTES # BLD AUTO: 0.49 X10(3)/MCL (ref 0.1–1.3)
MONOCYTES NFR BLD AUTO: 10.4 %
NEUTROPHILS # BLD AUTO: 2.29 X10(3)/MCL (ref 2.1–9.2)
NEUTROPHILS NFR BLD AUTO: 48.5 %
NRBC BLD AUTO-RTO: 0 %
PLATELET # BLD AUTO: 332 X10(3)/MCL (ref 130–400)
PMV BLD AUTO: 10.1 FL (ref 7.4–10.4)
PREALB SERPL-MCNC: 22 MG/DL (ref 14–37)
RBC # BLD AUTO: 4.53 X10(6)/MCL (ref 4.2–5.4)
TIBC SERPL-MCNC: 321 UG/DL (ref 70–310)
TIBC SERPL-MCNC: 367 UG/DL (ref 250–450)
TRANSFERRIN SERPL-MCNC: 348 MG/DL (ref 173–360)
WBC # BLD AUTO: 4.72 X10(3)/MCL (ref 4.5–11.5)

## 2024-07-12 PROCEDURE — 84134 ASSAY OF PREALBUMIN: CPT

## 2024-07-12 PROCEDURE — 83550 IRON BINDING TEST: CPT

## 2024-07-12 PROCEDURE — 85025 COMPLETE CBC W/AUTO DIFF WBC: CPT

## 2024-07-12 PROCEDURE — 36415 COLL VENOUS BLD VENIPUNCTURE: CPT

## 2024-07-12 RX ORDER — FERROUS GLUCONATE 324(38)MG
324 TABLET ORAL 2 TIMES DAILY WITH MEALS
Qty: 60 TABLET | Refills: 1 | Status: SHIPPED | OUTPATIENT
Start: 2024-07-12 | End: 2024-09-10

## 2024-07-15 ENCOUNTER — TELEPHONE (OUTPATIENT)
Dept: FAMILY MEDICINE | Facility: CLINIC | Age: 69
End: 2024-07-15
Payer: MEDICARE

## 2024-07-15 NOTE — TELEPHONE ENCOUNTER
----- Message from Tracy Merino MD sent at 7/12/2024  2:14 PM CDT -----  Iron level is mildly low, 46, normal: , needs iron rich diet and discontinue over the counter iron supplement as directed, I sent a prescription for iron to take twice daily, recheck CBC and iron level in 4-6 weeks. Remaining labs are essentially normal.

## 2024-07-16 DIAGNOSIS — E61.1 IRON DEFICIENCY: ICD-10-CM

## 2024-07-16 RX ORDER — FERROUS GLUCONATE 324(38)MG
324 TABLET ORAL 2 TIMES DAILY WITH MEALS
Qty: 60 TABLET | Refills: 1 | Status: SHIPPED | OUTPATIENT
Start: 2024-07-16 | End: 2024-09-14

## 2024-08-08 DIAGNOSIS — E61.1 IRON DEFICIENCY: ICD-10-CM

## 2024-08-08 RX ORDER — FERROUS GLUCONATE 324(37.5)
324 TABLET ORAL 2 TIMES DAILY WITH MEALS
Qty: 180 TABLET | Refills: 1 | Status: SHIPPED | OUTPATIENT
Start: 2024-08-08

## 2024-09-23 ENCOUNTER — PATIENT MESSAGE (OUTPATIENT)
Dept: FAMILY MEDICINE | Facility: CLINIC | Age: 69
End: 2024-09-23

## 2024-09-23 ENCOUNTER — E-VISIT (OUTPATIENT)
Dept: FAMILY MEDICINE | Facility: CLINIC | Age: 69
End: 2024-09-23
Payer: MEDICARE

## 2024-09-23 ENCOUNTER — TELEPHONE (OUTPATIENT)
Dept: FAMILY MEDICINE | Facility: CLINIC | Age: 69
End: 2024-09-23
Payer: MEDICARE

## 2024-09-23 DIAGNOSIS — G47.00 INSOMNIA, UNSPECIFIED TYPE: Primary | ICD-10-CM

## 2024-09-23 RX ORDER — SUVOREXANT 10 MG/1
1 TABLET, FILM COATED ORAL NIGHTLY PRN
Qty: 30 TABLET | Refills: 2 | Status: SHIPPED | OUTPATIENT
Start: 2024-09-23

## 2024-09-23 NOTE — PROGRESS NOTES
Patient ID: Florence Swann is a 68 y.o. female.    Chief Complaint: General Illness (Entered automatically based on patient selection in [employer].)    The patient initiated a request through [employer] on 9/23/2024 for evaluation and management with a chief complaint of General Illness (Entered automatically based on patient selection in [employer].)     I evaluated the questionnaire submission on 09/23/2024.    [facility] Peq Evisit General    9/23/2024  1:32 PM CDT - Filed by Patient   Do you agree to participate in an E-Visit? Yes   If you have any of the following symptoms, please present to your local emergency room or call 911:  I acknowledge   Choose the state of your primary residence [place]   What is the main issue you would like addressed today? Not sleeping well at night. I go to bed falling asleep really good. Then om up and down all night.   Please describe your symptoms Restless sleeping   Where is your problem located? Restless sleeping   How severe are your symptoms? Severe   Have you had these symptoms before? Yes   How long have you been having these symptoms? For one to four weeks   Please list any medications or treatments you have used for your condition and indicate if it was effective or not.    What makes this feel better? Rest   What makes this feel worse? No sleep   Are these symptoms related to a condition that you currently have? No   Please describe any probable cause for these symptoms I have no idea   Provide any additional information you feel is important.    Please attach any relevant images or files    Are you able to take your vital signs? No         Encounter Diagnosis   Name Primary?    Insomnia, unspecified type Yes        No orders of the defined types were placed in this encounter.     Medications Ordered This Encounter   Medications    suvorexant (BELSOMRA) 10 mg Tab     Sig: Take 1 tablet by mouth nightly as needed (insomnia).     Dispense:  30 tablet     Refill:  2     I sent a sleep  aid (Belsomra) to take nightly and to help her fall and stay asleep.  reviewed today. Proper sleep hygiene handout is attached. Medication dose can be increased if needed/tolerated. If symptoms are refractory to treatment, will proceed with obtaining an at home sleep study. I also ordered a followup evisit in 4 weeks or she can contact the office sooner with any questions or concerns. Notify M.D. or ER if symptoms persist or worsen, adverse medication side effects, temp >100.4, or any acute illness.        Follow up in about 4 weeks (around 10/21/2024) for Fatoumata raymundo, fernando.      E-Visit Time Tracking:    Day 1 Time (in minutes): 9    Total Time (in minutes): 9

## 2024-09-23 NOTE — TELEPHONE ENCOUNTER
----- Message from Naida Kearney sent at 9/23/2024  1:18 PM CDT -----  Regarding: med  .Type:  Needs Medical Advice    Who Called: pt  Symptoms (please be specific):    How long has patient had these symptoms:    Pharmacy name and phone #:    Would the patient rather a call back or a response via [facility]?   Best Call Back Number:  [phone]  Additional Information: sleep med

## 2024-10-21 ENCOUNTER — E-VISIT (OUTPATIENT)
Dept: FAMILY MEDICINE | Facility: CLINIC | Age: 69
End: 2024-10-21
Payer: MEDICARE

## 2024-10-21 DIAGNOSIS — E61.1 IRON DEFICIENCY: ICD-10-CM

## 2024-10-21 DIAGNOSIS — K25.7 CHRONIC GASTRIC ULCER WITHOUT HEMORRHAGE AND WITHOUT PERFORATION: Primary | ICD-10-CM

## 2024-10-24 RX ORDER — FAMOTIDINE 20 MG/1
20 TABLET, FILM COATED ORAL DAILY
Qty: 90 TABLET | Refills: 3 | Status: SHIPPED | OUTPATIENT
Start: 2024-10-24 | End: 2025-10-24

## 2024-10-24 RX ORDER — FAMOTIDINE 20 MG/1
20 TABLET, FILM COATED ORAL DAILY
COMMUNITY
End: 2024-10-24 | Stop reason: SDUPTHER

## 2024-10-24 RX ORDER — MISOPROSTOL 100 UG/1
100 TABLET ORAL
COMMUNITY
Start: 2024-07-08 | End: 2024-10-24 | Stop reason: SDUPTHER

## 2024-10-24 RX ORDER — MISOPROSTOL 100 UG/1
100 TABLET ORAL
Qty: 120 TABLET | Refills: 2 | Status: SHIPPED | OUTPATIENT
Start: 2024-10-24 | End: 2025-01-22

## 2024-10-24 RX ORDER — FERROUS GLUCONATE 324(37.5)
324 TABLET ORAL 2 TIMES DAILY WITH MEALS
Qty: 180 TABLET | Refills: 3 | Status: SHIPPED | OUTPATIENT
Start: 2024-10-24 | End: 2025-10-24

## 2024-10-24 NOTE — PROGRESS NOTES
Patient ID: Florence Swann is a 68 y.o. female.    Chief Complaint: Medication Management (Entered automatically based on patient selection in [employer].)    The patient initiated a request through [employer] on 10/21/2024 for evaluation and management with a chief complaint of Medication Management (Entered automatically based on patient selection in [employer].)     I evaluated the questionnaire submission on 10/24/2024.    [facility] Peq Evisit Medication    10/24/2024  6:43 AM CDT - Filed by Patient   Do you agree to participate in an E-Visit? Yes   If you have any of the following symptoms, please present to your local emergency room or call 911:  I acknowledge   Medication requests for narcotics will not be addressed via an E-Visit.  Please schedule an appointment. I acknowledge   Choose the state of your primary residence [place]   Do you want to address a new or existing medication? I would like to address a medication I currently take   What is the main issue you would like addressed today? Misoprostol, Famotidine and Iron refills.   Would you like to change or continue your medication? Continue medication   What medication would you like to continue?  Misoprositol, Famotidine and Iron   Are you taking it as prescribed? Yes    What medical condition is the  medication intended to treat? Ulcers   Is the medication helping your condition? Yes   Are you having any side effects from the medication? No   Provide any additional information you feel is important. No additional information   Please attach any relevant images or files    Are you able to take your vital signs? No         Encounter Diagnoses   Name Primary?    Chronic gastric ulcer without hemorrhage and without perforation Yes    Iron deficiency         No orders of the defined types were placed in this encounter.     Medications Ordered This Encounter   Medications    famotidine (PEPCID) 20 MG tablet     Sig: Take 1 tablet (20 mg total) by mouth once daily.      Dispense:  90 tablet     Refill:  3    ferrous gluconate 324 mg (37.5 mg iron) Tab tablet     Sig: Take 1 tablet (324 mg total) by mouth 2 (two) times daily with meals.     Dispense:  180 tablet     Refill:  3    miSOPROStoL (CYTOTEC) 100 MCG Tab     Sig: Take 1 tablet (100 mcg total) by mouth 4 (four) times daily with meals and nightly.     Dispense:  120 tablet     Refill:  2      Patient has a history of gastric ulcers s/p bariatric surgery, controlled with Rx Misoprostol, Pepcid and oral iron, no side effects, Rx for all three refilled today.  reviewed today. Notify M.D. or ER if symptoms persist or worsen, shortness of breath, chest pain, abdominal pain, vomiting, bloody stools, temp >100.4, or any acute illness.       Follow up if symptoms worsen or fail to improve.      E-Visit Time Tracking:    Day 1 Time (in minutes): 12    Total Time (in minutes): 12

## 2025-05-13 DIAGNOSIS — K25.7 CHRONIC GASTRIC ULCER WITHOUT HEMORRHAGE AND WITHOUT PERFORATION: ICD-10-CM

## 2025-05-13 RX ORDER — FAMOTIDINE 20 MG/1
20 TABLET, FILM COATED ORAL DAILY
Qty: 90 TABLET | Refills: 0 | Status: SHIPPED | OUTPATIENT
Start: 2025-05-13 | End: 2026-05-13

## 2025-05-13 RX ORDER — MISOPROSTOL 100 UG/1
100 TABLET ORAL
Qty: 120 TABLET | Refills: 0 | OUTPATIENT
Start: 2025-05-13 | End: 2025-08-11

## 2025-05-14 DIAGNOSIS — K25.7 CHRONIC GASTRIC ULCER WITHOUT HEMORRHAGE AND WITHOUT PERFORATION: ICD-10-CM

## 2025-05-14 RX ORDER — MISOPROSTOL 100 UG/1
100 TABLET ORAL
Qty: 120 TABLET | Refills: 0 | Status: SHIPPED | OUTPATIENT
Start: 2025-05-14 | End: 2025-08-12

## 2025-06-18 ENCOUNTER — TELEPHONE (OUTPATIENT)
Dept: FAMILY MEDICINE | Facility: CLINIC | Age: 70
End: 2025-06-18
Payer: MEDICARE

## 2025-06-18 NOTE — TELEPHONE ENCOUNTER
Pre visit call made  Was able to talk to the patient  Confirmed appt, no labs were ordered.      Susie Shafer CMA

## 2025-06-19 ENCOUNTER — OFFICE VISIT (OUTPATIENT)
Dept: FAMILY MEDICINE | Facility: CLINIC | Age: 70
End: 2025-06-19
Payer: MEDICARE

## 2025-06-19 VITALS
HEIGHT: 67 IN | OXYGEN SATURATION: 99 % | WEIGHT: 217.38 LBS | DIASTOLIC BLOOD PRESSURE: 73 MMHG | HEART RATE: 51 BPM | BODY MASS INDEX: 34.12 KG/M2 | SYSTOLIC BLOOD PRESSURE: 108 MMHG | TEMPERATURE: 98 F

## 2025-06-19 DIAGNOSIS — E66.811 OBESITY, CLASS I, BMI 30.0-34.9 (SEE ACTUAL BMI): ICD-10-CM

## 2025-06-19 DIAGNOSIS — J44.9 CHRONIC OBSTRUCTIVE PULMONARY DISEASE, UNSPECIFIED COPD TYPE: ICD-10-CM

## 2025-06-19 DIAGNOSIS — Z87.891 PERSONAL HISTORY OF NICOTINE DEPENDENCE: ICD-10-CM

## 2025-06-19 DIAGNOSIS — Z12.11 ENCOUNTER FOR COLORECTAL CANCER SCREENING: ICD-10-CM

## 2025-06-19 DIAGNOSIS — Z00.00 MEDICARE ANNUAL WELLNESS VISIT, SUBSEQUENT: Primary | ICD-10-CM

## 2025-06-19 DIAGNOSIS — E61.1 IRON DEFICIENCY: ICD-10-CM

## 2025-06-19 DIAGNOSIS — Z12.12 ENCOUNTER FOR COLORECTAL CANCER SCREENING: ICD-10-CM

## 2025-06-19 DIAGNOSIS — Z13.1 ENCOUNTER FOR SCREENING FOR DIABETES MELLITUS: ICD-10-CM

## 2025-06-19 DIAGNOSIS — R29.6 RECURRENT FALLS: ICD-10-CM

## 2025-06-19 DIAGNOSIS — G89.11 ACUTE SHOULDER PAIN DUE TO TRAUMA, RIGHT: ICD-10-CM

## 2025-06-19 DIAGNOSIS — M25.511 ACUTE SHOULDER PAIN DUE TO TRAUMA, RIGHT: ICD-10-CM

## 2025-06-19 DIAGNOSIS — M81.0 OSTEOPOROSIS WITHOUT CURRENT PATHOLOGICAL FRACTURE, UNSPECIFIED OSTEOPOROSIS TYPE: ICD-10-CM

## 2025-06-19 DIAGNOSIS — Z12.31 BREAST CANCER SCREENING BY MAMMOGRAM: ICD-10-CM

## 2025-06-19 DIAGNOSIS — E78.5 HYPERLIPIDEMIA, UNSPECIFIED HYPERLIPIDEMIA TYPE: ICD-10-CM

## 2025-06-19 NOTE — PROGRESS NOTES
Patient ID: [id number]     Chief Complaint: Annual Exam, Arm Pain (Right arm pain following fall. Arm is soar and intermittent pain occurs. Pain worsens when picking up/holding an object. Current pain score 3/10 and 10/10 and worst), and Fall (Pt had 2 falls within 1 month. Both falls injured the right arm, right knee. Notes bruises, discoloration, and aching.)      HPI:     Florence Swann is a 69 y.o. female here today for a Medicare Wellness.     The patient presents for well adult exam.    The patient's general health status is described as good.    The patient's diet is described as balanced.    Exercise: occasional.    Associated symptoms consist of denies weight loss, denies weight gain, denies fatigue, denies headache, denies hearing loss and denies vision changes.    Additional pertinent history: last dental exam: has dentures, last eye exam: 11/2024, goes annually to [facility] (wears contacts)  She is due for annual labs.   2 weeks ago was last fall. Had another fall 2 weeks prior. 1st fall was taking trash to the curb. She notes that she walked outside in the rain and her foot caught a stepping stone and she was close to steps to the house so she reached out to avoid to her face hitting the step. She fell on R side at that time and had minor bruising. She had bruising Right outer toes from that fall. She notes that her most recent fall was on her porch 2 weeks after her first fall. She states that she was wearing flip flops and the edge of her shoe got caught on the edge of rug on her porch. She notes that she fell again on R side, but was unable to catch her fall and landed on her R shoulder. She states that after the second fall she has bruising to R leg and has had persistent pain in her R shoulder/ arm. She notes R arm weakness with lifting and holding small objects, like a coffee cup. Denies any recent imaging. Sees chiropractor Dr. Dean Morgan and regular gets spinal adjustments of  neck.    Patient is without any other complaints today.        PMH/Chronic issues (problems addressed at this visit are noted under A&P)  H/o colon polyps in 2015, will need repeat colonoscopy. Pt sees Dr. Maza, who recommended colonoscopy in 2025.   H/o gastric bypass surgery with post-operative complications. She is s/p gastric bypass revision with Bariatric Surgery (Dr. Terri Greco) on 03/12/2024.  S/p 2 balloon dilatation of esophageal strictures with Dr. Maza.   Microcytic anemia -on multivitamins, last hgb 12.3  1st degree AV block, sinus bradycardia. Seeing Dr. Perez - Cardiology. 24 hour event monitor confirming 1st degree AV block, sinus jamison with occasional PAC and junctional. Had lexiscan done 09/2023, normal perfusion study. Last was seen 2023  Lumbar disk disease, sees Dr. Cr with LOS, s/p fusion surgery on 12/20/23. No longer sees him     Health Maintenance  Lung CA screen: 04/2024, benign findings, repeat annual screening this year due  DEXA recent in 2021, osteoporosis, she needs schedule for this year  Mammogram: 04/08/2024, WNL- due for exam for 2025  Declines PCV 20, RSV vaccine, and COVID-19 vaccine, she is UTD on all other vaccines. S/p hysterectomy, no PAP indicated     denies Urinary leakage.  Admits Recent falls  as listed in HPI, no balance difficulty.   admits to Daily exercise or physical activity.  denies Depression, stress, anxiety, or emotional lability.   denies The need for healthcare treatment including a cane/walker, blood pressure monitoring, or regular hearing tests. She reports some bilateral hearing loss, would like referral for hearing test.      Patient Care Team:  Tracy Merino MD as PCP - General (Family Medicine)  Rainer Vasquez MD (Gastroenterology)  Owen Maza MD as Consulting Physician (Gastroenterology)      Opioid Screening: Patient medication list reviewed, patient is not taking prescription opioids. Patient is not using additional  opioids than prescribed. Patient is at low risk of substance abuse based on this opioid use history.       Advance Care Planning  Date: 06/19/25     Power of   I initiated the process of voluntary advance care planning today and explained the importance of this process to the patient.  I introduced the concept of advance directives to the patient, as well. Then the patient received detailed information about the importance of designating a Health Care Power of  (HCPOA). She was also instructed to communicate with this person about their wishes for future healthcare, should she become sick and lose decision-making capacity. The patient has previously appointed a HCPOA. After our discussion, the patient has decided to complete a HCPOA and has appointed her son, health care agent: Ajith Samuel & health care agent number: [phone] I spent a total time of 5 minutes discussing this issue with the patient.           -------------------------------------    Former smoker    Osteoporosis        Health Maintenance         Date Due Completion Date    Mammogram 04/08/2025 4/8/2024    Colorectal Cancer Screening 09/02/2025 (Originally 1955) 12/7/2015    Influenza Vaccine (Season Ended) 09/01/2025 ---    Hemoglobin A1c (Diabetic Prevention Screening) 06/07/2027 6/7/2024    DEXA Scan 06/13/2027 6/13/2024    Lipid Panel 06/07/2029 6/7/2024    TETANUS VACCINE 04/11/2034 4/11/2024            Vaccinations -   Immunization History   Administered Date(s) Administered    Tdap 04/11/2024    Zoster Recombinant 03/21/2023, 06/21/2023        A separate E/M code has been provided to evaluate additional complaints that the patient would like addressed during the dedicated Medicare Wellness Exam.    Past Medical History:   Diagnosis Date    Currently smokes tobacco     Osteoporosis         Past Surgical History:   Procedure Laterality Date    APPENDECTOMY      CHOLECYSTECTOMY      COLONOSCOPY  12/07/2015      Rainer Vasquez    GASTRIC BYPASS      HYSTERECTOMY      LUMBAR FUSION      L4, L5, S1        Social History     Socioeconomic History    Marital status:    Tobacco Use    Smoking status: Former     Current packs/day: 0.00     Types: Cigarettes     Quit date: 2023     Years since quittin.4    Smokeless tobacco: Never   Substance and Sexual Activity    Alcohol use: Never    Drug use: Never    Sexual activity: Not Currently     Social Drivers of Health     Financial Resource Strain: Low Risk  (2025)    Overall Financial Resource Strain (CARDIA)     Difficulty of Paying Living Expenses: Not hard at all   Food Insecurity: No Food Insecurity (2025)    Hunger Vital Sign     Worried About Running Out of Food in the Last Year: Never true     Ran Out of Food in the Last Year: Never true   Transportation Needs: No Transportation Needs (2025)    PRAPARE - Transportation     Lack of Transportation (Medical): No     Lack of Transportation (Non-Medical): No   Physical Activity: Inactive (2025)    Exercise Vital Sign     Days of Exercise per Week: 0 days     Minutes of Exercise per Session: 0 min   Stress: No Stress Concern Present (2025)    [employer] - Occupational Stress Questionnaire     Feeling of Stress : Not at all   Housing Stability: Low Risk  (2025)    Housing Stability Vital Sign     Unable to Pay for Housing in the Last Year: No     Number of Times Moved in the Last Year: 0     Homeless in the Last Year: No        Current Outpatient Medications   Medication Instructions    famotidine (PEPCID) 20 mg, Oral, Daily    miSOPROStoL (CYTOTEC) 100 mcg, Oral, 4 times daily with meals & nightly       Review of patient's allergies indicates:   Allergen Reactions    Banana Swelling    Latex, natural rubber Hives and Itching    Morphine Hallucinations    Opioids - morphine analogues Anxiety and Hallucinations        Patient Care Team:  Tracy Merino  "MD KINGS as PCP - General (Family Medicine)  Rainer Vasquez MD (Gastroenterology)  Owen Maza MD as Consulting Physician (Gastroenterology)    Subjective:     Review of Systems   Constitutional:  Negative for unexpected weight change.   HENT: Negative.     Eyes: Negative.    Respiratory: Negative.  Negative for chest tightness and shortness of breath.    Cardiovascular:  Negative for chest pain and leg swelling.   Gastrointestinal: Negative.    Endocrine: Negative.    Genitourinary: Negative.    Musculoskeletal:         R arm / shoulder pain   Skin: Negative.    Neurological:  Positive for weakness. Negative for dizziness and light-headedness.        R arm weakness   Hematological: Negative.    Psychiatric/Behavioral: Negative.         12 point review of systems conducted, negative except as stated in the history of present illness. See HPI for details.    Patient Reported Health Risk Assessments:       Objective:     Visit Vitals  /73 (BP Location: Right arm, Patient Position: Sitting)   Pulse (!) 51   Temp 98.2 °F (36.8 °C) (Oral)   Ht 5' 7" (1.702 m)   Wt 98.6 kg (217 lb 6.4 oz)   SpO2 99%   BMI 34.05 kg/m²       Physical Exam  Constitutional:       Appearance: Normal appearance. She is obese.   HENT:      Head: Normocephalic and atraumatic.   Eyes:      Extraocular Movements: Extraocular movements intact.      Pupils: Pupils are equal, round, and reactive to light.   Cardiovascular:      Rate and Rhythm: Regular rhythm. Bradycardia present.   Pulmonary:      Effort: Pulmonary effort is normal.      Breath sounds: Normal breath sounds.   Musculoskeletal:         General: Tenderness present.      Right lower leg: No edema.      Left lower leg: No edema.      Comments: R shoulder acromion tenderness to palpation; Pain with abduction across chest. Negative lift off test. Equal 2+  strength bilaterally   Skin:     General: Skin is warm and dry.      Capillary Refill: Capillary refill takes less " than 2 seconds.   Neurological:      General: No focal deficit present.      Mental Status: She is alert and oriented to person, place, and time. Mental status is at baseline.   Psychiatric:         Mood and Affect: Mood normal.         Behavior: Behavior normal.         Thought Content: Thought content normal.         Assessment:       ICD-10-CM ICD-9-CM   1. Medicare annual wellness visit, subsequent  Z00.00 V70.0   2. Acute shoulder pain due to trauma, right  M25.511 719.41    G89.11 338.11   3. Recurrent falls  R29.6 V15.88   4. Breast cancer screening by mammogram  Z12.31 V76.12   5. Encounter for colorectal cancer screening  Z12.11 V76.51    Z12.12 V76.41   6. Hyperlipidemia, unspecified hyperlipidemia type  E78.5 272.4   7. Iron deficiency  E61.1 280.9   8. Osteoporosis without current pathological fracture, unspecified osteoporosis type  M81.0 733.00   9. Personal history of nicotine dependence  Z87.891 V15.82   10. Chronic obstructive pulmonary disease, unspecified COPD type  J44.9 496   11. Obesity, Class I, BMI 30.0-34.9 (see actual BMI)  E66.811 278.00   12. Encounter for screening for diabetes mellitus  Z13.1 V77.1        Plan:     1. Medicare annual wellness visit, subsequent  -     CBC Auto Differential; Future; Expected date: 06/19/2025  -     Comprehensive Metabolic Panel; Future; Expected date: 06/19/2025  -     Lipid Panel; Future; Expected date: 06/19/2025  -     TSH; Future; Expected date: 06/19/2025  -     Hemoglobin A1C; Future; Expected date: 06/19/2025  -     Urinalysis; Future; Expected date: 06/19/2025  Monthly breast self exam encouraged. Diet, exercise, and 10% weight loss encouraged. Keep appointment for dental exams x q6 months as scheduled. Keep appointment for annual eye exam as scheduled. Continue followup with specialists as scheduled. Notify M.D. or ER if temp greater than 100.4, or any acute illness.       2. Acute shoulder pain due to trauma, right  -     Ambulatory  referral/consult to Orthopedics; Future; Expected date: 06/26/2025  Xray ordered of R shoulder to r/o fracture.  Referral also sent to Orthopedics t further evaluate patinet for possible rotator cuff injury    3. Recurrent falls  -     X-ray Shoulder 2 or More Views Right; Future; Expected date: 06/19/2025  Same as #2  Fall prevention discussed in detail.  Suggest getting life alert for living at home alone  Lower Your Risk of Falling  Wear your eyeglasses. Have regular eye checkups. Do not use reading glasses when you walk around.  Quit smoking and limit alcohol intake. Smoking and too much alcohol can decrease bone mass and increase the chance of broken bones.  Know the side effects of the drugs you are taking. Some drugs may affect your balance and cause confusion or sleepiness.  Get up slowly after you sit or lie down. Do not change positions quickly. Do not rush when you need to go to the bathroom or to answer the phone.  Stay Physically Active  Be physically active. This will help to improve your strength and balance.  Fear of falling may lead you to avoid activities. Talk to your doctor. You may be sent to a physical therapist. This person can help you improve balance and build your confidence. Getting rid of your fear of falling can help you stay active and prevent future falls.  Join an exercise program. Ask your doctor what exercise is safe for you. Be sure to ask before you do any exercises, especially if you have illnesses like arthritis. Exercise can help you keep muscles strong and help with your balance. It is also a good way to learn proper ways to do each activity or exercise.  Safety Tips at Home  Keep your floors and walking areas clear from clutter. Remove furniture that blocks your way. Secure cords and wires near the wall to avoid tripping over them. Get rid of throw rugs.  Be sure the lights in your house are working well and provide good lighting throughout your home. Make sure you can reach  switches and lamps easily. Place a lamp close to your bed that is easy to reach.  Fix all steps and sidewalks to make them smooth and even. Put handrails and lights on stairs.  Keep all the things you use often on low shelves or in cabinets that are at about waist level. Ask for help to move items off of high shelves. Do not use a chair as a step stool.  Keep your bathroom area safe. Use nonslip rubber mats on the floor and in the tub or shower.  Keep a phone near you in case of emergency. Keep a list of your emergency contact numbers in large print near your phone. Carry a phone with you when you go for a walk. Consider using a personal alarm device that could call for help in case you fall and cannot get up.  Think about protecting your hip. Hip protectors may be needed if you have a higher chance for falling. Ask your doctor about this.    4. Breast cancer screening by mammogram  -     Mammo Digital Screening Bilat w/ Gonzalez (XPD); Future; Expected date: 06/19/2025    5. Encounter for colorectal cancer screening  -     Ambulatory referral/consult to Gastroenterology; Future; Expected date: 07/19/2025  Referral sent to Dr. Maza to get UTD on colonoscopy.    6. Hyperlipidemia, unspecified hyperlipidemia type  -     Lipid Panel; Future; Expected date: 06/19/2025    7. Iron deficiency  An iron-rich diet focuses on consuming foods that are good sources of iron, particularly to prevent or treat iron deficiency anemia. Key sources include meat, poultry, fish, beans, lentils, and iron-fortified cereals.   Routine CBC ordered     8. Osteoporosis without current pathological fracture, unspecified osteoporosis type  -     DXA Bone Density Axial Skeleton 1 or more sites; Future; Expected date: 06/19/2025    9. Personal history of nicotine dependence  -     CT Chest Lung Screening Low Dose; Future; Expected date: 06/19/2025  Yearly lung cancer screening ordered  Tobacco use Counseling    Discussion included:  Past attempts  and outcomes:  2years ago, successful  Other smokers in the house: No.    Assessment:  Readiness to Quit:  Quit 2 years ago    Counseling  Time spent: 3 minutes.  Topics covered:  Tobacco proof home and car-Yes.  Changing daily routines-Yes.  Dealing with urges to smoke/avoiding triggers-Yes.  Getting support- Yes.  Teach behavior skills- Yes.  Reinforce benefits- Yes.  Pharmacotherapy: Not indicated.  Education provided: Patient education selected. Offered at check out & available in the patient portal.     8. Chronic obstructive pulmonary disease, unspecified COPD type  Stable  Use inhalers as prescribed (rinse mouth after use of steroid inhalers). Use long term inhalers daily and rescue inhaler as needed.  Avoid triggers (high humidity, strong odors, chemical fumes).  Report signs of upper respiratory infection immediately for early treatment.  Flu shot recommended yearly.  Practice abdominal breathing. Eat smaller, more frequent meals.  Smoking Cessation encouraged.       9. Obesity, Class I, BMI 30.0-34.9 (see actual BMI)  -     Hemoglobin A1C; Future; Expected date: 06/19/2025  -     Vitamin D; Future; Expected date: 06/19/2025  Body mass index is 34.05 kg/m².  Goal BMI <30.  Exercise 5 times a week for 30 minutes per day.  Avoid soda, simple sugars, excessive rice, potatoes or bread. Limit fast foods and fried foods.  Choose complex carbs in moderation (example: green vegetables, beans, oatmeal). Eat plenty of fresh fruits and vegetables with lean meats daily.  Do not skip meals. Eat a balanced portion size.  Avoid fad diets. Consider permanent healthy life style changes.      10. Encounter for screening for diabetes mellitus  -     Hemoglobin A1C; Future; Expected date: 06/19/2025      Fall Risk + Home Safety + Living Situation + Whisper Test + Depression Screen + CAGE + Cognitive Impairment Screen + ADL Screen + Timed Get Up and Go + Nutrition Screen + PAQ Screen + Health Risk Assessment all reviewed.                   No data to display                  6/19/2025    10:00 AM 6/5/2024     1:30 PM 4/30/2024     9:20 AM 4/11/2024     9:20 AM 1/5/2024     1:20 PM 12/11/2023     9:40 AM 8/8/2023     9:40 AM   Fall Risk Assessment - Outpatient   Mobility Status Ambulatory Ambulatory Ambulatory Ambulatory Ambulatory Ambulatory Ambulatory   Number of falls 2+ 0 0 0 0 0 0   Identified as fall risk True False False False False False False          Over the last two weeks how often have you been bothered by little interest or pleasure in doing things: 1  Over the last two weeks how often have you been bothered by feeling down, depressed or hopeless: 1  PHQ-2 Total Score: 2    CVD Risk Factors - Reviewed    The 10-year ASCVD risk score (Kavita LOUIE, et al., 2019) is: 4.9%    Values used to calculate the score:      Age: 69 years      Sex: Female      Is Non- : No      Diabetic: No      Tobacco smoker: No      Systolic Blood Pressure: 108 mmHg      Is BP treated: No      HDL Cholesterol: 67 mg/dL      Total Cholesterol: 218 mg/dL      Obesity/Physical Activity -  Encouraged daily 30 minute physical activity x 5 days per week.        The patient's Health Maintenance was reviewed and the following appears to be due at this time:   Health Maintenance Due   Topic Date Due    Mammogram  04/08/2025       Follow up for 6 months routine follow up, w/ MD. In addition to their scheduled follow up, the patient has also been instructed to follow up on as needed basis.     Future Appointments   Date Time Provider Department Center   6/20/2025  8:30 AM Mehdi Maza MD [facility] [place]   12/19/2025  8:30 AM [facility]         Provided patient with a 5-10 year written screening schedule and personal prevention plan. Recommendations were developed using the USPSTF age appropriate recommendations. Education, counseling, and referrals were provided as needed. After Visit  Summary printed and given to patient which includes a list of additional screenings\tests needed.    ROSA Ferrara

## 2025-06-20 ENCOUNTER — HOSPITAL ENCOUNTER (OUTPATIENT)
Dept: RADIOLOGY | Facility: CLINIC | Age: 70
Discharge: HOME | End: 2025-06-20
Attending: FAMILY MEDICINE
Payer: MEDICARE

## 2025-06-20 ENCOUNTER — OFFICE VISIT (OUTPATIENT)
Dept: ORTHOPEDICS | Facility: CLINIC | Age: 70
End: 2025-06-20
Payer: MEDICARE

## 2025-06-20 VITALS
SYSTOLIC BLOOD PRESSURE: 129 MMHG | HEART RATE: 49 BPM | DIASTOLIC BLOOD PRESSURE: 80 MMHG | WEIGHT: 216.38 LBS | HEIGHT: 67 IN | BODY MASS INDEX: 33.96 KG/M2

## 2025-06-20 DIAGNOSIS — M54.12 CERVICAL RADICULITIS: Primary | ICD-10-CM

## 2025-06-20 DIAGNOSIS — M25.511 ACUTE SHOULDER PAIN DUE TO TRAUMA, RIGHT: ICD-10-CM

## 2025-06-20 DIAGNOSIS — G89.11 ACUTE SHOULDER PAIN DUE TO TRAUMA, RIGHT: ICD-10-CM

## 2025-06-20 DIAGNOSIS — M54.2 NECK PAIN: ICD-10-CM

## 2025-06-20 DIAGNOSIS — M47.812 CERVICAL SPONDYLOSIS WITHOUT MYELOPATHY: ICD-10-CM

## 2025-06-20 PROCEDURE — 72040 X-RAY EXAM NECK SPINE 2-3 VW: CPT | Mod: ,,, | Performed by: FAMILY MEDICINE

## 2025-06-20 PROCEDURE — 73030 X-RAY EXAM OF SHOULDER: CPT | Mod: RT,,, | Performed by: FAMILY MEDICINE

## 2025-06-20 NOTE — PROGRESS NOTES
Sports Medicine [place]  Mehdi Maza MD    Patient ID: [id number]     Chief Complaint: Shoulder Pain (RT shoulder pain - Patient reports about a month ago she slipped and landed to the RT side, fell again 2 weeks ago when she tripped and fell on the RT side. Describes constant dull aching pain that worsens with activity. Reports pain down the arm into the wrist, limits in ROM due to pain. )      History of Present Illness:     Florence Swann is a 69 y.o. female here today for a sports medicine visit.     Patient presents with shoulder pain following a fall approximately one month ago. Pain is located in the back of the shoulder and worsens with movement, particularly when holding objects like a cup of coffee. She reports loss of strength in the affected arm and difficulty holding a cup of coffee, requiring the use of both hands. She denies loss of coordination with the hand, or difficulty with writing, or with maintaining  on items. Pain bothers her at night. Symptoms have persisted for about four weeks. She takes Tylenol for pain management when necessary.    She reports pain when moving the arm. She denies any bruising on the upper arm but mentions bruising elsewhere. She also denies numbness, tingling, and loss of coordination in the hand or fingers.    She has a history of neck pain, for which she has been receiving chiropractic adjustments. 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    PREVIOUS TREATMENTS:  Patient has undergone chiropractic adjustments for neck pain.          Past Medical History:   Diagnosis Date    Currently smokes tobacco     Osteoporosis         Past Surgical History:   Procedure Laterality Date    APPENDECTOMY      CHOLECYSTECTOMY      COLONOSCOPY  12/07/2015    Dr. Rainer Vasquez    GASTRIC BYPASS      HERNIA REPAIR  2024    HYSTERECTOMY      LUMBAR FUSION      L4, L5, S1  "   SPINE SURGERY  2023    Fused L4,L5,S1    TONSILLECTOMY      Tonsils removed in         Social History     Tobacco Use    Smoking status: Former     Current packs/day: 0.00     Types: Cigarettes     Quit date: 2023     Years since quittin.5    Smokeless tobacco: Never   Substance and Sexual Activity    Alcohol use: Not Currently     Comment: Rarely a glass of wine    Drug use: Never    Sexual activity: Not Currently     Partners: Male     Birth control/protection: None     Comment: Not currently sexually active        Current Outpatient Medications   Medication Instructions    famotidine (PEPCID) 20 mg, Oral, Daily    miSOPROStoL (CYTOTEC) 100 mcg, Oral, 4 times daily with meals & nightly       Review of patient's allergies indicates:   Allergen Reactions    Banana Swelling    Latex, natural rubber Hives and Itching    Morphine Hallucinations    Opioids - morphine analogues Anxiety and Hallucinations        Patient Care Team:  Tracy Merino MD as PCP - General (Family Medicine)  Rainer Vasquez MD (Gastroenterology)  Owen Maza MD as Consulting Physician (Gastroenterology)     Review of Systems:     Review of Systems    12 point review of systems conducted, negative except as stated in the history of present illness. See HPI for details.    Objective:     Visit Vitals  /80 (BP Location: Right arm, Patient Position: Sitting)   Pulse (!) 49   Ht 5' 7" (1.702 m)   Wt 98.2 kg (216 lb 6.4 oz)   BMI 33.89 kg/m²       Physical Exam     Hands Free Examination:  Patient is awake, alert and in no acute distress.  Respirations are non labored.  No abdominal distension is noted.  No visible rashes on exposed skin.  No lower extremity edema is present.    Right Shoulder:  Abduction 180  Flexion 180  Internal rotation reaches to the level of T4  External rotation 90 bilaterally  Strength of all ranges of motion is 5/5 against resistance.  Provocative maneuvers:  Empty can test " negative  Ojagdeep test negative  Robison negative  Yocums negative  Cross arm negative  Speeds negative  Labral Crank negative  Lift off Test negative  There is no pain with palpation of the AC joint.    C Spine:  No gait instability is appreciated.  Flexion: 45  Extension: 50  Rotation: 90 bilaterally  There is no pain with percussion of the C spine, no step offs are appreciated.  There is paraspinal muscle tenderness to palpation at the level of the right trapezius  Spurling Test positive on the right which produces pain in the right shoulder blade, trap and posterior shoulder.  Upper extremity strength is 5/5 throughout  No abnormal upper extremity reflexes present.  Sensation to light touch of the upper extremities is intact and symmetric.     Imaging Reviewed:   X-Rays of the right shoulder performed in office today show moderate AC joint degenerative changes, mild glenohumeral degenerative changes, no acute process.    X-rays of the cervical spine performed in office today show moderate disc space narrowing at C5-6 and C6-7 with small anterior spurs noted, no fractures or dislocation.    Assessment and Plan:     Assessment & Plan      1. Cervical radiculitis right upper extremity C7    -Currently the patient's symptoms are mild and intermittent, improving with time.  She has a reassuring physical examination today with no upper extremity muscle weakness, positive Spurling's on the right.  No abnormal reflexes or loss of sensation.  -She will continue Tylenol 3 times daily for pain, perform gentle range-of-motion exercises and we will observe this for the next 4 weeks.  -We may consider MRI c spine at that time if her symptoms have not resolved.         Mehdi Maza MD  Primary Care Sports Medicine     This note was generated with the assistance of ambient listening technology. Verbal consent was obtained by the patient and accompanying visitor(s) for the recording of patient appointment to facilitate this  note. I attest to having reviewed and edited the generated note for accuracy, though some syntax or spelling errors may persist. Please contact the author of this note for any clarification.

## 2025-06-24 ENCOUNTER — HOSPITAL ENCOUNTER (OUTPATIENT)
Dept: RADIOLOGY | Facility: HOSPITAL | Age: 70
Discharge: HOME | End: 2025-06-24
Payer: MEDICARE

## 2025-06-24 ENCOUNTER — RESULTS FOLLOW-UP (OUTPATIENT)
Dept: FAMILY MEDICINE | Facility: CLINIC | Age: 70
End: 2025-06-24

## 2025-06-24 DIAGNOSIS — Z87.891 PERSONAL HISTORY OF NICOTINE DEPENDENCE: ICD-10-CM

## 2025-06-24 DIAGNOSIS — Z12.31 BREAST CANCER SCREENING BY MAMMOGRAM: ICD-10-CM

## 2025-06-24 DIAGNOSIS — E55.9 VITAMIN D DEFICIENCY: Primary | ICD-10-CM

## 2025-06-24 PROCEDURE — 71271 CT THORAX LUNG CANCER SCR C-: CPT | Mod: TC

## 2025-06-24 PROCEDURE — 77067 SCR MAMMO BI INCL CAD: CPT | Mod: 26,,, | Performed by: RADIOLOGY

## 2025-06-24 PROCEDURE — 77063 BREAST TOMOSYNTHESIS BI: CPT | Mod: 26,,, | Performed by: RADIOLOGY

## 2025-06-24 PROCEDURE — 77063 BREAST TOMOSYNTHESIS BI: CPT | Mod: TC

## 2025-06-25 ENCOUNTER — TELEPHONE (OUTPATIENT)
Dept: FAMILY MEDICINE | Facility: CLINIC | Age: 70
End: 2025-06-25
Payer: MEDICARE

## 2025-06-25 NOTE — TELEPHONE ENCOUNTER
----- Message from ROSA Walls sent at 6/24/2025  6:11 PM CDT -----  Lung cancer screening negative for any suspicious lung nodules.  Repeat in 1 year    No incidental findings.    Please feel free to contact our office with any additional questions or concerns.    Thank you for allowing us to take care of your healthcare needs,    ROSA Walls-BELL  [facility] [address] [place] [address]  Phone: [phone]   ----- Message -----  From: Interface, Rad Results In  Sent: 6/24/2025   1:29 PM CDT  To: ROSA Ferrara

## 2025-06-27 ENCOUNTER — TELEPHONE (OUTPATIENT)
Dept: FAMILY MEDICINE | Facility: CLINIC | Age: 70
End: 2025-06-27
Payer: MEDICARE

## 2025-06-27 ENCOUNTER — LAB VISIT (OUTPATIENT)
Dept: LAB | Facility: HOSPITAL | Age: 70
End: 2025-06-27
Payer: MEDICARE

## 2025-06-27 DIAGNOSIS — E78.5 HYPERLIPIDEMIA, UNSPECIFIED HYPERLIPIDEMIA TYPE: ICD-10-CM

## 2025-06-27 DIAGNOSIS — Z13.1 ENCOUNTER FOR SCREENING FOR DIABETES MELLITUS: ICD-10-CM

## 2025-06-27 DIAGNOSIS — E61.1 IRON DEFICIENCY: ICD-10-CM

## 2025-06-27 DIAGNOSIS — Z00.00 MEDICARE ANNUAL WELLNESS VISIT, SUBSEQUENT: ICD-10-CM

## 2025-06-27 DIAGNOSIS — E66.811 OBESITY, CLASS I, BMI 30.0-34.9 (SEE ACTUAL BMI): ICD-10-CM

## 2025-06-27 LAB
25(OH)D3+25(OH)D2 SERPL-MCNC: 19 NG/ML (ref 30–80)
ALBUMIN SERPL-MCNC: 3.3 G/DL (ref 3.4–4.8)
ALBUMIN/GLOB SERPL: 1 RATIO (ref 1.1–2)
ALP SERPL-CCNC: 131 UNIT/L (ref 40–150)
ALT SERPL-CCNC: 12 UNIT/L (ref 0–55)
ANION GAP SERPL CALC-SCNC: 4 MEQ/L
AST SERPL-CCNC: 12 UNIT/L (ref 11–45)
BASOPHILS # BLD AUTO: 0.09 X10(3)/MCL
BASOPHILS NFR BLD AUTO: 1.9 %
BILIRUB SERPL-MCNC: 0.7 MG/DL
BUN SERPL-MCNC: 15.1 MG/DL (ref 9.8–20.1)
CALCIUM SERPL-MCNC: 8.4 MG/DL (ref 8.4–10.2)
CHLORIDE SERPL-SCNC: 110 MMOL/L (ref 98–107)
CHOLEST SERPL-MCNC: 189 MG/DL
CHOLEST/HDLC SERPL: 3 {RATIO} (ref 0–5)
CO2 SERPL-SCNC: 26 MMOL/L (ref 23–31)
CREAT SERPL-MCNC: 0.68 MG/DL (ref 0.55–1.02)
CREAT/UREA NIT SERPL: 22
EOSINOPHIL # BLD AUTO: 0.16 X10(3)/MCL (ref 0–0.9)
EOSINOPHIL NFR BLD AUTO: 3.5 %
ERYTHROCYTE [DISTWIDTH] IN BLOOD BY AUTOMATED COUNT: 13.3 % (ref 11.5–17)
EST. AVERAGE GLUCOSE BLD GHB EST-MCNC: 114 MG/DL
GFR SERPLBLD CREATININE-BSD FMLA CKD-EPI: >60 ML/MIN/1.73/M2
GLOBULIN SER-MCNC: 3.3 GM/DL (ref 2.4–3.5)
GLUCOSE SERPL-MCNC: 94 MG/DL (ref 82–115)
HBA1C MFR BLD: 5.6 %
HCT VFR BLD AUTO: 40.9 % (ref 37–47)
HDLC SERPL-MCNC: 58 MG/DL (ref 35–60)
HGB BLD-MCNC: 13.2 G/DL (ref 12–16)
IMM GRANULOCYTES # BLD AUTO: 0.01 X10(3)/MCL (ref 0–0.04)
IMM GRANULOCYTES NFR BLD AUTO: 0.2 %
IRON SATN MFR SERPL: 23 % (ref 20–50)
IRON SERPL-MCNC: 85 UG/DL (ref 50–170)
LDLC SERPL CALC-MCNC: 115 MG/DL (ref 50–140)
LYMPHOCYTES # BLD AUTO: 1.39 X10(3)/MCL (ref 0.6–4.6)
LYMPHOCYTES NFR BLD AUTO: 30 %
MCH RBC QN AUTO: 29.3 PG (ref 27–31)
MCHC RBC AUTO-ENTMCNC: 32.3 G/DL (ref 33–36)
MCV RBC AUTO: 90.9 FL (ref 80–94)
MONOCYTES # BLD AUTO: 0.47 X10(3)/MCL (ref 0.1–1.3)
MONOCYTES NFR BLD AUTO: 10.2 %
NEUTROPHILS # BLD AUTO: 2.51 X10(3)/MCL (ref 2.1–9.2)
NEUTROPHILS NFR BLD AUTO: 54.2 %
NRBC BLD AUTO-RTO: 0 %
PLATELET # BLD AUTO: 291 X10(3)/MCL (ref 130–400)
PMV BLD AUTO: 10 FL (ref 7.4–10.4)
POTASSIUM SERPL-SCNC: 4 MMOL/L (ref 3.5–5.1)
PROT SERPL-MCNC: 6.6 GM/DL (ref 5.8–7.6)
RBC # BLD AUTO: 4.5 X10(6)/MCL (ref 4.2–5.4)
SODIUM SERPL-SCNC: 140 MMOL/L (ref 136–145)
TIBC SERPL-MCNC: 278 UG/DL (ref 70–310)
TIBC SERPL-MCNC: 363 UG/DL (ref 250–450)
TRANSFERRIN SERPL-MCNC: 327 MG/DL (ref 173–360)
TRIGL SERPL-MCNC: 80 MG/DL (ref 37–140)
TSH SERPL-ACNC: 1.71 UIU/ML (ref 0.35–4.94)
VLDLC SERPL CALC-MCNC: 16 MG/DL
WBC # BLD AUTO: 4.63 X10(3)/MCL (ref 4.5–11.5)

## 2025-06-27 PROCEDURE — 82306 VITAMIN D 25 HYDROXY: CPT

## 2025-06-27 PROCEDURE — 84443 ASSAY THYROID STIM HORMONE: CPT

## 2025-06-27 PROCEDURE — 83540 ASSAY OF IRON: CPT

## 2025-06-27 PROCEDURE — 85025 COMPLETE CBC W/AUTO DIFF WBC: CPT

## 2025-06-27 PROCEDURE — 80061 LIPID PANEL: CPT

## 2025-06-27 PROCEDURE — 80053 COMPREHEN METABOLIC PANEL: CPT

## 2025-06-27 PROCEDURE — 83036 HEMOGLOBIN GLYCOSYLATED A1C: CPT

## 2025-06-27 PROCEDURE — 36415 COLL VENOUS BLD VENIPUNCTURE: CPT

## 2025-06-27 RX ORDER — ASPIRIN 325 MG
50000 TABLET, DELAYED RELEASE (ENTERIC COATED) ORAL
Qty: 12 CAPSULE | Refills: 0 | Status: SHIPPED | OUTPATIENT
Start: 2025-06-27

## 2025-06-27 NOTE — TELEPHONE ENCOUNTER
----- Message from ROSA Walls sent at 6/27/2025  8:34 AM CDT -----  Lab results reviewed in detail.    Liver function and kidney function within normal limits.  Chloride slightly elevated, advise patient to increase hydration.  Blood counts within normal limits, no signs of infection.  Thyroid within normal limits.  Lipid panel within normal limits continue with low-fat low-cholesterol diet and 30 minutes of cardiovascular exercise daily.  Hemoglobin A1c within normal limits at 5.6 (prediabetes starts at 5.7-6.4) continue with low carb diet  Vitamin-D level low at 19 (normal 30-80).  We will start patient on vitamin-D supplement prescription for her to take once weekly for 12 weeks.  Increase exposure to sun at least 15 minutes a day and   repeat vitamin-D level in six-months.  After completion of prescription patient can start vitamin-D 2000 units once daily over-the-counter for maintenance.    Thank you for allowing us to take care of your healthcare needs,    ROSA Walls-BELL  [facility] [address] [place] [place] [address]  Phone: [phone]   ----- Message -----  From: Lab, Background User  Sent: 6/27/2025   6:36 AM CDT  To: ROSA Ferrara

## 2025-06-30 ENCOUNTER — RESULTS FOLLOW-UP (OUTPATIENT)
Dept: FAMILY MEDICINE | Facility: CLINIC | Age: 70
End: 2025-06-30

## 2025-06-30 DIAGNOSIS — K25.7 CHRONIC GASTRIC ULCER WITHOUT HEMORRHAGE AND WITHOUT PERFORATION: ICD-10-CM

## 2025-06-30 RX ORDER — FAMOTIDINE 20 MG/1
20 TABLET, FILM COATED ORAL DAILY
Qty: 90 TABLET | Refills: 3 | Status: SHIPPED | OUTPATIENT
Start: 2025-06-30 | End: 2026-06-30

## 2025-06-30 RX ORDER — MISOPROSTOL 100 UG/1
100 TABLET ORAL
Qty: 120 TABLET | Refills: 5 | Status: SHIPPED | OUTPATIENT
Start: 2025-06-30 | End: 2025-12-27

## 2025-07-24 ENCOUNTER — TELEPHONE (OUTPATIENT)
Dept: FAMILY MEDICINE | Facility: CLINIC | Age: 70
End: 2025-07-24
Payer: MEDICARE

## 2025-07-24 ENCOUNTER — OFFICE VISIT (OUTPATIENT)
Dept: ORTHOPEDICS | Facility: CLINIC | Age: 70
End: 2025-07-24
Payer: MEDICARE

## 2025-07-24 ENCOUNTER — E-VISIT (OUTPATIENT)
Dept: FAMILY MEDICINE | Facility: CLINIC | Age: 70
End: 2025-07-24
Payer: MEDICARE

## 2025-07-24 VITALS
HEART RATE: 48 BPM | SYSTOLIC BLOOD PRESSURE: 141 MMHG | BODY MASS INDEX: 34.87 KG/M2 | WEIGHT: 222.19 LBS | HEIGHT: 67 IN | DIASTOLIC BLOOD PRESSURE: 90 MMHG

## 2025-07-24 DIAGNOSIS — M54.12 CERVICAL RADICULITIS: Primary | ICD-10-CM

## 2025-07-24 DIAGNOSIS — L30.9 VULVAR DERMATITIS: Primary | ICD-10-CM

## 2025-07-24 PROCEDURE — 99213 OFFICE O/P EST LOW 20 MIN: CPT | Mod: ,,, | Performed by: FAMILY MEDICINE

## 2025-07-24 RX ORDER — TRIAMCINOLONE ACETONIDE 1 MG/G
OINTMENT TOPICAL 2 TIMES DAILY
Qty: 30 G | Refills: 1 | Status: SHIPPED | OUTPATIENT
Start: 2025-07-24 | End: 2025-08-07

## 2025-07-24 RX ORDER — HYDROXYZINE PAMOATE 25 MG/1
25 CAPSULE ORAL EVERY 8 HOURS PRN
Qty: 30 CAPSULE | Refills: 0 | Status: SHIPPED | OUTPATIENT
Start: 2025-07-24

## 2025-07-24 NOTE — PROGRESS NOTES
Sports Medicine [place]  Mehdi Maza MD    Patient ID: [id number]     Chief Complaint: Follow-up (4 wk F/U RT shoulder pain - Patient reports her pain and ROM has significantly improved. Reports inner forearm will become weak and sore when holding items in that hands for long periods. No numbness/tingling. )      History of Present Illness:     Florence Swann is a 69 y.o. female here today for a sports medicine visit.     Patient presents for follow-up of right wrist pain. She reports soreness in her right wrist, particularly when holding a cup of coffee. She feels discomfort in a specific area when holding a cup of coffee, describing it as still slightly sore. She has not stopped her normal activities but has been taping her hand consistently. She notes weakness in her right hand, requiring the use of both hands to hold objects due to insufficient strength in one hand. She denies any other limitations due to her arm or shoulder, as well as any constant numbness or progressive weakness in the affected area.    She has a history of arthritis in her thumbs, with one thumb appearing subluxed. She mentions receiving chiropractic adjustments for her C spine, which has been helpful. She also reports a history of fusion on L4-L5 and S1.    PREVIOUS TREATMENTS:  Patient receives chiropractic adjustments for the cervical spine and thumb, which provide some relief. She also received an injection in the ankle in the past, which she described as very painful and ineffective.          Past Medical History:   Diagnosis Date    Currently smokes tobacco     Osteoporosis         Past Surgical History:   Procedure Laterality Date    APPENDECTOMY      CHOLECYSTECTOMY      COLONOSCOPY  12/07/2015    Dr. Rainer Vasquez    GASTRIC BYPASS      HERNIA REPAIR  2024    HYSTERECTOMY      LUMBAR FUSION      L4, L5, S1    SPINE SURGERY  12/20/2023    Fused L4,L5,S1    TONSILLECTOMY      Tonsils removed in 1977        Social History  "    Tobacco Use    Smoking status: Former     Current packs/day: 0.00     Average packs/day: 1 pack/day for 48.0 years (48.0 ttl pk-yrs)     Types: Cigarettes     Quit date: 2023     Years since quittin.5    Smokeless tobacco: Never   Substance and Sexual Activity    Alcohol use: Not Currently     Comment: Rarely a glass of wine    Drug use: Never    Sexual activity: Not Currently     Partners: Male     Birth control/protection: None     Comment: Not currently sexually active        Current Outpatient Medications   Medication Instructions    cholecalciferol (vitamin D3) 50,000 Units, Oral, Every 7 days    famotidine (PEPCID) 20 mg, Oral, Daily    miSOPROStoL (CYTOTEC) 100 mcg, Oral, 4 times daily with meals & nightly       Review of patient's allergies indicates:   Allergen Reactions    Banana Swelling    Latex, natural rubber Hives and Itching    Morphine Hallucinations    Opioids - morphine analogues Anxiety and Hallucinations        Patient Care Team:  Tracy Merino MD as PCP - General (Family Medicine)  Rainer Vasquez MD (Gastroenterology)  Owen Maza MD as Consulting Physician (Gastroenterology)     Review of Systems:     Review of Systems    12 point review of systems conducted, negative except as stated in the history of present illness. See HPI for details.    Objective:     Visit Vitals  BP (!) (P) 140/83 (BP Location: Left arm, Patient Position: Sitting)   Pulse (!) (P) 53   Ht 5' 7" (1.702 m)   Wt 100.8 kg (222 lb 3.2 oz)   BMI 34.80 kg/m²       Physical Exam     Hands Free Examination:  Patient is awake, alert and in no acute distress.  Respirations are non labored.  No abdominal distension is noted.  No visible rashes on exposed skin.  No lower extremity edema is present.    C Spine:  No gait instability is appreciated.  There is no paraspinal muscle tenderness to palpation.  Spurling Test negative  Upper extremity strength is 5/5 throughout  No abnormal upper extremity " reflexes present.  Sensation to light touch of the upper extremities is intact and symmetric.    Imaging Reviewed:   And imaging findings have been reviewed.    Assessment and Plan:     Cervical radiculitis right upper extremity C7  Right shoulder pain    -Today the patient's physical examination is reassuring, she has no evidence of upper extremity muscle weakness and overall her pain has nearly resolved.  She continues to have some soreness in the right forearm but we will continue to observe this.  -The patient understands that if she has loss of coordination in the upper extremity, progressive weakness or worsening pain that I will need to see her back in clinic so that we can order a C-spine MRI.  -We will see her back as needed    Mehdi Maza MD  Primary Care Sports Medicine     This note was generated with the assistance of ambient listening technology. Verbal consent was obtained by the patient and accompanying visitor(s) for the recording of patient appointment to facilitate this note. I attest to having reviewed and edited the generated note for accuracy, though some syntax or spelling errors may persist. Please contact the author of this note for any clarification.

## 2025-08-30 ENCOUNTER — HOSPITAL ENCOUNTER (EMERGENCY)
Facility: HOSPITAL | Age: 70
Discharge: HOME | End: 2025-08-30
Attending: EMERGENCY MEDICINE
Payer: MEDICARE

## 2025-08-30 VITALS
HEART RATE: 55 BPM | OXYGEN SATURATION: 99 % | DIASTOLIC BLOOD PRESSURE: 84 MMHG | TEMPERATURE: 98 F | WEIGHT: 213 LBS | SYSTOLIC BLOOD PRESSURE: 132 MMHG | BODY MASS INDEX: 33.43 KG/M2 | RESPIRATION RATE: 16 BRPM | HEIGHT: 67 IN

## 2025-08-30 DIAGNOSIS — M79.671 RIGHT FOOT PAIN: ICD-10-CM

## 2025-08-30 PROCEDURE — 99284 EMERGENCY DEPT VISIT MOD MDM: CPT | Mod: 25

## 2025-08-30 RX ORDER — DICLOFENAC SODIUM 75 MG/1
75 TABLET, DELAYED RELEASE ORAL 2 TIMES DAILY PRN
Qty: 30 TABLET | Refills: 0 | Status: SHIPPED | OUTPATIENT
Start: 2025-08-30

## 2025-09-02 ENCOUNTER — OFFICE VISIT (OUTPATIENT)
Dept: FAMILY MEDICINE | Facility: CLINIC | Age: 70
End: 2025-09-02
Payer: MEDICARE

## 2025-09-02 VITALS
HEART RATE: 59 BPM | HEIGHT: 67 IN | SYSTOLIC BLOOD PRESSURE: 122 MMHG | BODY MASS INDEX: 34.82 KG/M2 | WEIGHT: 221.88 LBS | OXYGEN SATURATION: 98 % | DIASTOLIC BLOOD PRESSURE: 84 MMHG

## 2025-09-02 DIAGNOSIS — M79.671 RIGHT FOOT PAIN: ICD-10-CM

## 2025-09-02 DIAGNOSIS — R22.41 LOCALIZED SWELLING OF RIGHT FOOT: ICD-10-CM

## 2025-09-02 DIAGNOSIS — Z09 HOSPITAL DISCHARGE FOLLOW-UP: Primary | ICD-10-CM

## 2025-09-02 PROCEDURE — 99496 TRANSJ CARE MGMT HIGH F2F 7D: CPT | Mod: ,,,

## 2025-09-02 RX ORDER — METHYLPREDNISOLONE 4 MG/1
TABLET ORAL
Qty: 21 EACH | Refills: 0 | Status: SHIPPED | OUTPATIENT
Start: 2025-09-02 | End: 2025-09-23

## 2025-09-02 RX ORDER — ALLOPURINOL 300 MG/1
300 TABLET ORAL DAILY
Qty: 30 TABLET | Refills: 2 | Status: SHIPPED | OUTPATIENT
Start: 2025-09-02 | End: 2025-12-01

## 2025-09-03 DIAGNOSIS — R22.41 LOCALIZED SWELLING OF RIGHT FOOT: ICD-10-CM

## 2025-09-03 DIAGNOSIS — M79.671 RIGHT FOOT PAIN: Primary | ICD-10-CM

## 2025-09-03 RX ORDER — CLINDAMYCIN HYDROCHLORIDE 300 MG/1
300 CAPSULE ORAL 3 TIMES DAILY
Qty: 21 CAPSULE | Refills: 0 | Status: SHIPPED | OUTPATIENT
Start: 2025-09-03 | End: 2025-09-10

## 2025-09-04 ENCOUNTER — TELEPHONE (OUTPATIENT)
Dept: FAMILY MEDICINE | Facility: CLINIC | Age: 70
End: 2025-09-04

## 2025-09-04 ENCOUNTER — TELEPHONE (OUTPATIENT)
Dept: FAMILY MEDICINE | Facility: CLINIC | Age: 70
End: 2025-09-04
Payer: MEDICARE

## 2025-09-04 DIAGNOSIS — R22.41 LOCALIZED SWELLING OF RIGHT FOOT: Primary | ICD-10-CM
